# Patient Record
Sex: FEMALE | Race: WHITE | NOT HISPANIC OR LATINO | URBAN - METROPOLITAN AREA
[De-identification: names, ages, dates, MRNs, and addresses within clinical notes are randomized per-mention and may not be internally consistent; named-entity substitution may affect disease eponyms.]

---

## 2021-01-25 ENCOUNTER — OFFICE VISIT (OUTPATIENT)
Dept: INTERNAL MEDICINE | Facility: CLINIC | Age: 61
End: 2021-01-25
Payer: COMMERCIAL

## 2021-01-25 ENCOUNTER — TELEPHONE (OUTPATIENT)
Dept: INTERNAL MEDICINE | Facility: CLINIC | Age: 61
End: 2021-01-25

## 2021-01-25 VITALS
DIASTOLIC BLOOD PRESSURE: 82 MMHG | BODY MASS INDEX: 31.18 KG/M2 | WEIGHT: 194 LBS | HEIGHT: 66 IN | SYSTOLIC BLOOD PRESSURE: 132 MMHG | HEART RATE: 71 BPM | TEMPERATURE: 98.1 F | OXYGEN SATURATION: 97 %

## 2021-01-25 DIAGNOSIS — S46.011A TRAUMATIC TEAR OF RIGHT ROTATOR CUFF, UNSPECIFIED TEAR EXTENT, INITIAL ENCOUNTER: Primary | ICD-10-CM

## 2021-01-25 DIAGNOSIS — K63.5 POLYP OF COLON, UNSPECIFIED PART OF COLON, UNSPECIFIED TYPE: ICD-10-CM

## 2021-01-25 DIAGNOSIS — E78.5 HYPERLIPIDEMIA, UNSPECIFIED HYPERLIPIDEMIA TYPE: ICD-10-CM

## 2021-01-25 DIAGNOSIS — Z00.00 PHYSICAL EXAM: ICD-10-CM

## 2021-01-25 DIAGNOSIS — F51.04 PSYCHOPHYSIOLOGICAL INSOMNIA: ICD-10-CM

## 2021-01-25 PROBLEM — G89.29 CHRONIC RIGHT SHOULDER PAIN: Status: ACTIVE | Noted: 2021-01-25

## 2021-01-25 PROBLEM — M25.511 CHRONIC RIGHT SHOULDER PAIN: Status: ACTIVE | Noted: 2021-01-25

## 2021-01-25 PROCEDURE — 99396 PREV VISIT EST AGE 40-64: CPT | Performed by: INTERNAL MEDICINE

## 2021-01-25 RX ORDER — MIRTAZAPINE 7.5 MG/1
7.5 TABLET, FILM COATED ORAL NIGHTLY
Qty: 30 TABLET | Refills: 0 | Status: SHIPPED | OUTPATIENT
Start: 2021-01-25 | End: 2021-02-01 | Stop reason: ALTCHOICE

## 2021-01-25 SDOH — HEALTH STABILITY: MENTAL HEALTH: HOW OFTEN DO YOU HAVE A DRINK CONTAINING ALCOHOL?: MONTHLY OR LESS

## 2021-01-25 ASSESSMENT — ENCOUNTER SYMPTOMS
WHEEZING: 0
SHORTNESS OF BREATH: 0
DIZZINESS: 0
HEADACHES: 0
NECK PAIN: 0
UNEXPECTED WEIGHT CHANGE: 0
CHEST TIGHTNESS: 0
POLYPHAGIA: 0
MYALGIAS: 0
NERVOUS/ANXIOUS: 0
SLEEP DISTURBANCE: 0
BLOOD IN STOOL: 0
PALPITATIONS: 0
WEAKNESS: 0
ENDOCRINE NEGATIVE: 1
HEMATOLOGIC/LYMPHATIC NEGATIVE: 1
CONSTIPATION: 0
DIARRHEA: 0
APPETITE CHANGE: 0
POLYDIPSIA: 0
COLOR CHANGE: 0
FREQUENCY: 0
CARDIOVASCULAR NEGATIVE: 1
VOICE CHANGE: 0
ABDOMINAL PAIN: 0
DYSURIA: 0
ARTHRALGIAS: 1
FATIGUE: 0
BACK PAIN: 0
JOINT SWELLING: 0

## 2021-01-25 NOTE — ASSESSMENT & PLAN NOTE
She is working on diet control.  Reviewed heart healthy Mediterranean diet.  We will recheck in 6 months if no improvement will need statin

## 2021-01-25 NOTE — TELEPHONE ENCOUNTER
raya call Dr. Diana Christensen for the most recent blood work and immunization record- also nellie cm for colonoscopy - immunization from [facility]  - mammogram and gyne from Dr. Edmar Mcdowell ( [facility])

## 2021-01-25 NOTE — PROGRESS NOTES
Subjective      Patient ID: Mary Taylor is a 60 y.o. female.    She is here for a physical - new pt  - fell down a flight of stairs - has had persistant right shoulder pain -  Takes ibuprofen or tylenol without relief - had cortisone injection early November Has not had another.   Does not sleep well - falls asleep and wakes up but can't get back to sleep - has had lexapro and lunesta without relief.  Has a history of hyperlipidemia, uterine fibroids and hysterectomy. Still has cervix and ovaries. Has a history of colon polyps - last colonoscopy two years ago. - Mother with depression and psychosis   Father with lung caner sister with AODM.  Not on any medications at present.      The following have been reviewed and updated as appropriate in this visit:       Past Medical History:   Diagnosis Date   • Lipid disorder      Past Surgical History:   Procedure Laterality Date   • HYSTERECTOMY       Family History   Problem Relation Age of Onset   • Diabetes Biological Mother    • Lung cancer Biological Father    • Diabetes Biological Sister      Social History     Socioeconomic History   • Marital status: Other     Spouse name: Not on file   • Number of children: Not on file   • Years of education: Not on file   • Highest education level: Not on file   Occupational History   • Not on file   Social Needs   • Financial resource strain: Not on file   • Food insecurity     Worry: Not on file     Inability: Not on file   • Transportation needs     Medical: Not on file     Non-medical: Not on file   Tobacco Use   • Smoking status: Never Smoker   • Smokeless tobacco: Never Used   Substance and Sexual Activity   • Alcohol use: Yes     Frequency: Monthly or less   • Drug use: Never   • Sexual activity: Not on file   Lifestyle   • Physical activity     Days per week: Not on file     Minutes per session: Not on file   • Stress: Not on file   Relationships   • Social connections     Talks on phone: Not on file     Gets together:  Not on file     Attends Anglican service: Not on file     Active member of club or organization: Not on file     Attends meetings of clubs or organizations: Not on file     Relationship status: Not on file   • Intimate partner violence     Fear of current or ex partner: Not on file     Emotionally abused: Not on file     Physically abused: Not on file     Forced sexual activity: Not on file   Other Topics Concern   • Not on file   Social History Narrative   • Not on file       Review of Systems   Constitutional: Negative for appetite change, fatigue and unexpected weight change.   HENT: Negative for hearing loss and voice change.    Eyes: Negative for visual disturbance.   Respiratory: Negative for chest tightness, shortness of breath and wheezing.    Cardiovascular: Negative.  Negative for chest pain, palpitations and leg swelling.   Gastrointestinal: Negative for abdominal pain, blood in stool, constipation and diarrhea.   Endocrine: Negative.  Negative for cold intolerance, heat intolerance, polydipsia, polyphagia and polyuria.   Genitourinary: Negative for dysuria, frequency, pelvic pain, urgency and vaginal pain.   Musculoskeletal: Positive for arthralgias. Negative for back pain, gait problem, joint swelling, myalgias and neck pain.   Skin: Negative for color change, pallor and rash.   Neurological: Negative for dizziness, weakness and headaches.   Hematological: Negative.    Psychiatric/Behavioral: Negative for behavioral problems and sleep disturbance. The patient is not nervous/anxious.        No Known Allergies  Current Outpatient Medications   Medication Sig Dispense Refill   • mirtazapine (REMERON) 7.5 mg tablet Take 1 tablet (7.5 mg total) by mouth nightly. 30 tablet 0     No current facility-administered medications for this visit.        Objective   Vitals:    01/25/21 0802   BP: 132/82   BP Location: Right upper arm   Patient Position: Sitting   Pulse: 71   Temp: 36.7 °C (98.1 °F)   SpO2: 97%  "  Weight: 88 kg (194 lb)   Height: 1.676 m (5' 6\")       Physical Exam  Vitals signs and nursing note reviewed.   Constitutional:       Appearance: She is well-developed.   HENT:      Head: Normocephalic and atraumatic.      Right Ear: External ear normal.      Left Ear: External ear normal.      Nose: Nose normal.   Eyes:      Conjunctiva/sclera: Conjunctivae normal.      Pupils: Pupils are equal, round, and reactive to light.   Neck:      Musculoskeletal: Normal range of motion and neck supple.      Vascular: No carotid bruit.   Cardiovascular:      Rate and Rhythm: Normal rate and regular rhythm.      Heart sounds: Normal heart sounds. No murmur.   Pulmonary:      Effort: Pulmonary effort is normal.      Breath sounds: Normal breath sounds. No wheezing or rhonchi.   Abdominal:      General: Bowel sounds are normal.      Palpations: Abdomen is soft. There is no mass.      Tenderness: There is no abdominal tenderness.   Musculoskeletal: Normal range of motion.   Skin:     General: Skin is warm and dry.   Neurological:      Mental Status: She is alert and oriented to person, place, and time.   Psychiatric:         Behavior: Behavior normal.         Assessment/Plan   Problem List Items Addressed This Visit        Digestive    Colon polyps     He states her colonoscopy was 2 years ago I will need to obtain those results.  On a 5-year basis            Musculoskeletal    Traumatic tear of right rotator cuff - Primary     Concerned that she has a significant injury to her rotator cuff she is severely limited in her internal rotation and abduction.  Given that she has had symptoms for over 6 months now has failed on physical therapy and injection will obtain an MRI of the shoulder and have her see Dr. Braxton Beckett of orthopedics         Relevant Orders    MRI SHOULDER RIGHT WITHOUT CONTRAST    CBC       Endocrine/Metabolic    Hyperlipidemia, unspecified     She is working on diet control.  Reviewed heart healthy " Mediterranean diet.  We will recheck in 6 months if no improvement will need statin         Relevant Orders    Comprehensive metabolic panel    Lipid panel    TSH 3rd Generation       Other    Physical exam     Overall well-appearing.  I have reviewed the blood studies that she brought with her.  We will obtain a full blood panel as well.  She is current with her flu vaccine.  Obtain her immunization record from her prior PCP.  Should obtain Shingrix and Tdap but she wishes to wait until after the pandemic.  GYN exam is current as is mammogram.  We will obtain those results as well         Psychophysiological insomnia     Discussion regarding this.  I would like her to try mirtazapine 7.5 mg at bedtime.               Yaquelin Spence MD    1/25/2021

## 2021-01-25 NOTE — TELEPHONE ENCOUNTER
Requested most recent blood work and immunization record from Dr. Prabhakar office.  Requested most recent colonoscopy report from [facility].  Requested most recent immunization records fro [facility].  Requested most recent mammogram and gyn note from Dr. Lira office.

## 2021-01-25 NOTE — ASSESSMENT & PLAN NOTE
Overall well-appearing.  I have reviewed the blood studies that she brought with her.  We will obtain a full blood panel as well.  She is current with her flu vaccine.  Obtain her immunization record from her prior PCP.  Should obtain Shingrix and Tdap but she wishes to wait until after the pandemic.  GYN exam is current as is mammogram.  We will obtain those results as well

## 2021-01-25 NOTE — ASSESSMENT & PLAN NOTE
Concerned that she has a significant injury to her rotator cuff she is severely limited in her internal rotation and abduction.  Given that she has had symptoms for over 6 months now has failed on physical therapy and injection will obtain an MRI of the shoulder and have her see Dr. Braxton Beckett of orthopedics

## 2021-02-01 RX ORDER — RAMELTEON 8 MG/1
8 TABLET ORAL NIGHTLY
Qty: 7 TABLET | Refills: 0 | Status: SHIPPED | OUTPATIENT
Start: 2021-02-01 | End: 2021-02-18 | Stop reason: ALTCHOICE

## 2021-02-17 RX ORDER — MIRTAZAPINE 7.5 MG/1
TABLET, FILM COATED ORAL
Qty: 30 TABLET | Refills: 0 | OUTPATIENT
Start: 2021-02-17

## 2021-02-18 RX ORDER — SUVOREXANT 10 MG/1
10 TABLET, FILM COATED ORAL NIGHTLY PRN
Qty: 7 TABLET | Refills: 0 | Status: SHIPPED | OUTPATIENT
Start: 2021-02-18 | End: 2021-02-19 | Stop reason: SDUPTHER

## 2021-02-19 ENCOUNTER — TELEPHONE (OUTPATIENT)
Dept: INTERNAL MEDICINE | Facility: CLINIC | Age: 61
End: 2021-02-19

## 2021-02-19 DIAGNOSIS — S46.011A TRAUMATIC TEAR OF RIGHT ROTATOR CUFF, UNSPECIFIED TEAR EXTENT, INITIAL ENCOUNTER: ICD-10-CM

## 2021-02-19 RX ORDER — SUVOREXANT 10 MG/1
10 TABLET, FILM COATED ORAL NIGHTLY PRN
Qty: 30 TABLET | Refills: 1 | Status: SHIPPED | OUTPATIENT
Start: 2021-02-19 | End: 2021-02-19 | Stop reason: ALTCHOICE

## 2021-02-19 RX ORDER — ZOLPIDEM TARTRATE 5 MG/1
5 TABLET ORAL NIGHTLY PRN
Qty: 30 TABLET | Refills: 1 | Status: SHIPPED | OUTPATIENT
Start: 2021-02-19 | End: 2021-03-02 | Stop reason: ALTCHOICE

## 2021-02-19 NOTE — TELEPHONE ENCOUNTER
Re Belsomra 10 mg tablet:    Alternative requested, non formulary.    Also must be written for 30 tablets.  Unbreakable packaging.

## 2021-02-19 NOTE — TELEPHONE ENCOUNTER
Can you please call open MRI at [facility] in [facility] for the pts MRI of the shoulder

## 2021-03-02 RX ORDER — ZOLPIDEM TARTRATE 6.25 MG/1
6.25 TABLET, FILM COATED, EXTENDED RELEASE ORAL NIGHTLY PRN
Qty: 30 TABLET | Refills: 3 | Status: SHIPPED | OUTPATIENT
Start: 2021-03-02 | End: 2021-03-23 | Stop reason: SDUPTHER

## 2021-03-23 RX ORDER — ZOLPIDEM TARTRATE 6.25 MG/1
6.25 TABLET, FILM COATED, EXTENDED RELEASE ORAL NIGHTLY PRN
Qty: 30 TABLET | Refills: 3 | Status: SHIPPED | OUTPATIENT
Start: 2021-03-23 | End: 2021-11-08 | Stop reason: ALTCHOICE

## 2021-03-31 ENCOUNTER — APPOINTMENT (RX ONLY)
Dept: URBAN - METROPOLITAN AREA CLINIC 23 | Facility: CLINIC | Age: 61
Setting detail: DERMATOLOGY
End: 2021-03-31

## 2021-03-31 DIAGNOSIS — D22 MELANOCYTIC NEVI: ICD-10-CM

## 2021-03-31 DIAGNOSIS — L28.1 PRURIGO NODULARIS: ICD-10-CM

## 2021-03-31 DIAGNOSIS — L81.4 OTHER MELANIN HYPERPIGMENTATION: ICD-10-CM

## 2021-03-31 DIAGNOSIS — L57.0 ACTINIC KERATOSIS: ICD-10-CM

## 2021-03-31 DIAGNOSIS — L82.1 OTHER SEBORRHEIC KERATOSIS: ICD-10-CM

## 2021-03-31 PROBLEM — D48.5 NEOPLASM OF UNCERTAIN BEHAVIOR OF SKIN: Status: ACTIVE | Noted: 2021-03-31

## 2021-03-31 PROBLEM — D22.5 MELANOCYTIC NEVI OF TRUNK: Status: ACTIVE | Noted: 2021-03-31

## 2021-03-31 PROCEDURE — ? INTRALESIONAL KENALOG

## 2021-03-31 PROCEDURE — ? LIQUID NITROGEN

## 2021-03-31 PROCEDURE — 11900 INJECT SKIN LESIONS </W 7: CPT | Mod: 59

## 2021-03-31 PROCEDURE — ? PRESCRIPTION

## 2021-03-31 PROCEDURE — 99203 OFFICE O/P NEW LOW 30 MIN: CPT | Mod: 25

## 2021-03-31 PROCEDURE — 17000 DESTRUCT PREMALG LESION: CPT | Mod: 59

## 2021-03-31 PROCEDURE — ? COUNSELING

## 2021-03-31 PROCEDURE — 11102 TANGNTL BX SKIN SINGLE LES: CPT

## 2021-03-31 PROCEDURE — ? ADDITIONAL NOTES

## 2021-03-31 PROCEDURE — ? SUNSCREEN TREATMENT REGIMEN

## 2021-03-31 PROCEDURE — ? BIOPSY BY SHAVE METHOD

## 2021-03-31 RX ORDER — TRIAMCINOLONE ACETONIDE 1 MG/G
OINTMENT TOPICAL
Qty: 1 | Refills: 0 | Status: ERX | COMMUNITY
Start: 2021-03-31

## 2021-03-31 RX ADMIN — TRIAMCINOLONE ACETONIDE: 1 OINTMENT TOPICAL at 00:00

## 2021-03-31 ASSESSMENT — LOCATION ZONE DERM
LOCATION ZONE: TRUNK
LOCATION ZONE: ARM

## 2021-03-31 ASSESSMENT — LOCATION SIMPLE DESCRIPTION DERM
LOCATION SIMPLE: ABDOMEN
LOCATION SIMPLE: RIGHT SHOULDER
LOCATION SIMPLE: LEFT UPPER BACK
LOCATION SIMPLE: CHEST

## 2021-03-31 ASSESSMENT — LOCATION DETAILED DESCRIPTION DERM
LOCATION DETAILED: LEFT MEDIAL SUPERIOR CHEST
LOCATION DETAILED: RIGHT ANTERIOR SHOULDER
LOCATION DETAILED: LEFT SUPERIOR UPPER BACK
LOCATION DETAILED: EPIGASTRIC SKIN
LOCATION DETAILED: RIGHT POSTERIOR SHOULDER

## 2021-03-31 NOTE — PROCEDURE: INTRALESIONAL KENALOG
Total Volume Injected (Ccs- Only Use Numbers And Decimals): 0.4
X Size Of Lesion In Cm (Optional): 0
Consent: The risks of atrophy were reviewed with the patient.
Detail Level: Simple
Administered By (Optional): Carrol Fernandez
Medical Necessity Clause: This procedure was medically necessary because the lesions that were treated were:
Include Z78.9 (Other Specified Conditions Influencing Health Status) As An Associated Diagnosis?: No
Concentration Of Solution Injected (Mg/Ml): 7.5
Kenalog Preparation: Kenalog

## 2021-03-31 NOTE — PROCEDURE: LIQUID NITROGEN
Post-Care Instructions: I reviewed with the patient in detail post-care instructions. Patient is to wear sunprotection, and avoid picking at any of the treated lesions. Pt may apply Vaseline to crusted or scabbing areas.
Consent: The patient's consent was obtained including but not limited to risks of crusting, scabbing, blistering, scarring, darker or lighter pigmentary change, recurrence, incomplete removal and infection.
Render Note In Bullet Format When Appropriate: No
Number Of Freeze-Thaw Cycles: 2 freeze-thaw cycles
Duration Of Freeze Thaw-Cycle (Seconds): 3
Detail Level: Simple

## 2021-04-19 RX ORDER — MIRTAZAPINE 7.5 MG/1
TABLET, FILM COATED ORAL
Qty: 30 TABLET | Refills: 0 | Status: SHIPPED | OUTPATIENT
Start: 2021-04-19 | End: 2021-09-11 | Stop reason: ALTCHOICE

## 2021-09-08 DIAGNOSIS — R73.01 ELEVATED FASTING BLOOD SUGAR: ICD-10-CM

## 2021-09-08 DIAGNOSIS — Z11.59 ENCOUNTER FOR HEPATITIS C SCREENING TEST FOR LOW RISK PATIENT: Primary | ICD-10-CM

## 2021-09-08 DIAGNOSIS — R74.01 ELEVATED TRANSAMINASE LEVEL: ICD-10-CM

## 2021-09-08 LAB
ALBUMIN SERPL-MCNC: 4.5 G/DL (ref 3.8–4.9)
ALBUMIN/GLOB SERPL: 1.7 {RATIO} (ref 1.2–2.2)
ALP SERPL-CCNC: 75 IU/L (ref 48–121)
ALT SERPL-CCNC: 39 IU/L (ref 0–32)
AST SERPL-CCNC: 27 IU/L (ref 0–40)
BILIRUB SERPL-MCNC: 0.7 MG/DL (ref 0–1.2)
BUN SERPL-MCNC: 18 MG/DL (ref 6–24)
BUN/CREAT SERPL: 26 (ref 9–23)
CALCIUM SERPL-MCNC: 9.6 MG/DL (ref 8.7–10.2)
CHLORIDE SERPL-SCNC: 103 MMOL/L (ref 96–106)
CHOLEST SERPL-MCNC: 235 MG/DL (ref 100–199)
CO2 SERPL-SCNC: 26 MMOL/L (ref 20–29)
CREAT SERPL-MCNC: 0.7 MG/DL (ref 0.57–1)
ERYTHROCYTE [DISTWIDTH] IN BLOOD BY AUTOMATED COUNT: 13.5 % (ref 11.7–15.4)
GLOBULIN SER CALC-MCNC: 2.6 G/DL (ref 1.5–4.5)
GLUCOSE SERPL-MCNC: 101 MG/DL (ref 65–99)
HCT VFR BLD AUTO: 37.4 % (ref 34–46.6)
HDLC SERPL-MCNC: 40 MG/DL
HGB BLD-MCNC: 12.6 G/DL (ref 11.1–15.9)
LAB CORP EGFR IF AFRICN AM: 110 ML/MIN/1.73
LAB CORP EGFR IF NONAFRICN AM: 95 ML/MIN/1.73
LDLC SERPL CALC-MCNC: 165 MG/DL (ref 0–99)
MCH RBC QN AUTO: 27.3 PG (ref 26.6–33)
MCHC RBC AUTO-ENTMCNC: 33.7 G/DL (ref 31.5–35.7)
MCV RBC AUTO: 81 FL (ref 79–97)
PLATELET # BLD AUTO: 252 X10E3/UL (ref 150–450)
POTASSIUM SERPL-SCNC: 4.5 MMOL/L (ref 3.5–5.2)
PROT SERPL-MCNC: 7.1 G/DL (ref 6–8.5)
RBC # BLD AUTO: 4.62 X10E6/UL (ref 3.77–5.28)
SODIUM SERPL-SCNC: 141 MMOL/L (ref 134–144)
TRIGL SERPL-MCNC: 161 MG/DL (ref 0–149)
TSH SERPL DL<=0.005 MIU/L-ACNC: 2.33 UIU/ML (ref 0.45–4.5)
VLDLC SERPL CALC-MCNC: 30 MG/DL (ref 5–40)
WBC # BLD AUTO: 6.8 X10E3/UL (ref 3.4–10.8)

## 2021-10-15 ENCOUNTER — TELEPHONE (OUTPATIENT)
Dept: INTERNAL MEDICINE | Facility: CLINIC | Age: 61
End: 2021-10-15

## 2021-10-15 LAB
ALBUMIN SERPL-MCNC: 4.7 G/DL (ref 3.8–4.9)
ALP SERPL-CCNC: 78 IU/L (ref 44–121)
ALT SERPL-CCNC: 48 IU/L (ref 0–32)
AST SERPL-CCNC: 31 IU/L (ref 0–40)
BILIRUB DIRECT SERPL-MCNC: 0.13 MG/DL (ref 0–0.4)
BILIRUB SERPL-MCNC: 0.6 MG/DL (ref 0–1.2)
HBA1C MFR BLD: 6.1 % (ref 4.8–5.6)
HCV AB S/CO SERPL IA: <0.1 S/CO RATIO (ref 0–0.9)
PROT SERPL-MCNC: 7.1 G/DL (ref 6–8.5)

## 2021-10-15 NOTE — TELEPHONE ENCOUNTER
Please have pt make telemed or office visit to go over her results of her liver function tests - see e mail

## 2021-11-08 ENCOUNTER — TELEMEDICINE (OUTPATIENT)
Dept: INTERNAL MEDICINE | Facility: CLINIC | Age: 61
End: 2021-11-08
Payer: COMMERCIAL

## 2021-11-08 DIAGNOSIS — K76.0 HEPATIC STEATOSIS: ICD-10-CM

## 2021-11-08 DIAGNOSIS — R79.89 ELEVATED LIVER FUNCTION TESTS: Primary | ICD-10-CM

## 2021-11-08 PROCEDURE — 99213 OFFICE O/P EST LOW 20 MIN: CPT | Mod: 95 | Performed by: INTERNAL MEDICINE

## 2021-11-08 ASSESSMENT — ENCOUNTER SYMPTOMS
NAUSEA: 0
DIARRHEA: 0
CONSTIPATION: 0
UNEXPECTED WEIGHT CHANGE: 0
ABDOMINAL PAIN: 0

## 2021-11-08 NOTE — PROGRESS NOTES
Verification of Patient Location:  The patient affirms they are currently located in the following state: [place]    Request for Consent:    Audio and Video Encounter   Hello, my name is Yaquelin Spence MD.  Before we proceed, can you please verify your identification by telling me your full name and date of birth?  Can you tell me who is in the room with you?    You and I are about to have a telemedicine check-in or visit because you have requested it.  This is a live video-conference.  I am a real person, speaking to you in real time.  There is no one else with me on the video-conference.  However, when we use ([employer], uConnect, etc) it is important for you to know that the video-conference may not be secure or private.  I am not recording this conversation and I am asking you not to record it.  This telemedicine visit will be billed to your health insurance or you, if you are self-insured.  You understand you will be responsible for any copayments or coinsurances that apply to your telemedicine visit.  Communication platform used for this encounter:  [facility] Video Visit (with Zoom integration)     Before starting our telemedicine visit, I am required to get your consent for this virtual check-in or visit by telemedicine. Do you consent?      Patient Response to Request for Consent:  Yes      Visit Documentation:  Subjective     Patient ID: Mary Taylor is a 61 y.o. female.  1960      HPI   This is a telemedicine video visit via [employer] to go over recent laboratory tests.  Of note she has a mild elevation in her transaminases.  Denies any use of nonsteroidal anti-inflammatory medication she does use Tylenol periodically but not in excess of 2000 mg daily.  She does not drink alcohol to any significant degree.  No known history of blood transfusion however she has had a hysterectomy was not sure if she received blood then.  She feels well.  Denies any abdominal pain nausea vomiting change in  bowel habits jaundice or change in color of her stool.  She does occasionally get indigestion with reflux of gastric material into the esophagus and throat when she eats later at night and larger amounts.  No melena or hematochezia.  Review of prior records reveals history of hepatic steatosis by ultrasound in 2019.    The following have been reviewed and updated as appropriate in this visit:       Review of Systems   Constitutional: Negative for unexpected weight change.   Gastrointestinal: Negative for abdominal pain, constipation, diarrhea and nausea.         Assessment/Plan   Diagnoses and all orders for this visit:    Elevated liver function tests (Primary)  Assessment & Plan:  This is most likely secondary to hepatic steatosis.  However we will screen for hepatitis B and C check CAIT and ferritin as well.    Orders:  -     Hepatitis C antibody; Future  -     Hepatitis B surface antigen; Future  -     Hepatitis B surface antibody; Future  -     Hepatitis B core antibody, total; Future  -     Ferritin; Future  -     CAIT Screen (Automated); Future  -     ULTRASOUND ABDOMEN LIMITED; Future    Hepatic steatosis  Assessment & Plan:  We will recheck her ultrasound of the right upper quadrant.  Counseled on low carbohydrate low sugar diet.    Orders:  -     ULTRASOUND ABDOMEN LIMITED; Future      Time Spent:  I spent 20 minutes on this date of service performing the following activities: obtaining history, entering orders, documenting, providing counseling and education and communicating results.

## 2021-11-08 NOTE — LETTER
[facility] HEALTH LAB REQUISITION  [facility] Internal Medicine [address] [place]  [facility] [address]  Phone:  [phone]  Fax:  [phone]    [facility] Account Number - [id number]  [facility] Account Number - [id number]      Patient:    Duane Wilder MRN:  [id number]   [address] [facility] [address] :  1960  Sex:  F   Phone: [phone]      INSURANCE PAYOR PLAN GROUP # SUBSCRIBER ID   Primary:   [facility] [id number]  Insurance: [facility]  Plan Name: PERSONAL CHOICE [id number]     Order Date:  2021             ULTRASOUND ABDOMEN LIMITED  CPT: 63965   (Order ID: [id number])   Diagnosis:  Elevated liver function tests (R79.89)  Hepatic steatosis (K76.0)   Priority:  Routine  Release to patient: Immediate                       Authorized by: Yaquelin Reina MD   (NPI: [id number])    E-Signature: Yaquelin Reina MD                       To schedule radiology appointments with [facility]     call Central Scheduling at [phone]  To schedule PET scans call PET Scheduling [phone]    To schedule Low-Dose CT Scan for Lung Cancer Screening  call [phone]        Lab draws do not require an appointment                 [facility] Page 3 of 1   Account #: [id number] Collection Date:         Req/Control #: [id number] Collection Time:            Client / Ordering Site Information: Physician Information:   Account Name: [facility] [place]   Address 1: [address] [place] [address] [place]   Address 2:    [facility] Zip: [place] [address]   Phone: [phone]    Ordering: Yaquelin Reina   Degree: MD   NPI: [id number]   UPIN:    Physician ID:          Patient Information:   Name: DUANE WILDER   Gender: Female   SSN: [id number]   Patient ID: [id number]    YOB: 1960 (61 years)   Phone: [phone]   Address: [address] [place] [address]  [place] [address]            Comments:         Order Code Tests Ordered (Total: 6)    Order Code Tests Ordered      926697 Hepatitis C antibody   219474 Hepatitis B surface antigen   410959 Hepatitis B surface antibody    168947 Hepatitis B core antibody, total   892789 Ferritin   466354 CAIT Screen (Automated)            Specimen Source:   (897951) Hepatitis C antibody:  Blood, Venous    (280426) Hepatitis B surface antigen:  Blood, Venous      (080935) Hepatitis B surface antibody:  Blood, Venous    (619413) Hepatitis B core antibody, total:  Blood, Venous      (806708) Ferritin:  Blood, Venous    (460498) CAIT Screen (Automated):  Blood, Venous            Diagnosis Codes:   R79.89                 Bill Type: Third-Party    Carrier Code: Not on file         Responsible Party / Guarantor Information:   Name: DUANE WILDER   Address: [address] [place] [address] Zip: [place] [address]   Phone: [phone]   Relation to Pt: Self   Employer Name:          ABN:     Worker's Comp: N Date of Injury:             Insurance Information:   Primary Insurance: Secondary Insurance:   Carrier Code: Not on file   Ins Co Name: PERSONAL CHOICE   Address 1: [facility] [address]   Address 2:    [facility] Zip: [place] [address]   Policy Number: [id number]   Group #: [id number]    Carrier Code:    Ins Co Name:    Address 1:    Address 2:    City, State Zip:    Policy Number:    Group #:       Primary Policy De Dios / Insured: Secondary Policy De [place] / Insured:   Name: DUANE WILDER   Address: [address] [place] [address]   Pt Relation to Subscriber: Self    Name:    Address:         Pt Relation to Subscriber:           Authorization - Please Sign and Date  I hereby authorize the release of medical information related to the services described hereon and authorize payment directly to [facility].      E-Signature: Yaquelin Reina MD                          11/8/2021       __  Provider Signature             Date      __________________________________                    ______________  Patient Signature                                                                    Date

## 2021-11-08 NOTE — ASSESSMENT & PLAN NOTE
We will recheck her ultrasound of the right upper quadrant.  Counseled on low carbohydrate low sugar diet.

## 2021-11-08 NOTE — ASSESSMENT & PLAN NOTE
This is most likely secondary to hepatic steatosis.  However we will screen for hepatitis B and C check CAIT and ferritin as well.

## 2022-01-15 ENCOUNTER — TELEPHONE (OUTPATIENT)
Dept: INTERNAL MEDICINE | Facility: CLINIC | Age: 62
End: 2022-01-15
Payer: COMMERCIAL

## 2022-01-15 ENCOUNTER — HOSPITAL ENCOUNTER (OUTPATIENT)
Dept: RADIOLOGY | Age: 62
Discharge: HOME | End: 2022-01-15
Attending: INTERNAL MEDICINE
Payer: COMMERCIAL

## 2022-01-15 DIAGNOSIS — R79.89 ELEVATED LIVER FUNCTION TESTS: ICD-10-CM

## 2022-01-15 DIAGNOSIS — K76.0 HEPATIC STEATOSIS: ICD-10-CM

## 2022-01-15 LAB
ANA TITR SER IF: NEGATIVE {TITER}
FERRITIN SERPL-MCNC: 93 NG/ML (ref 15–150)
HBV CORE AB SERPL QL IA: NEGATIVE
HBV CORE IGM SERPL QL IA: NEGATIVE
HBV SURFACE AB SER QL: NON REACTIVE
HBV SURFACE AG SERPL QL IA: NEGATIVE
HCV AB S/CO SERPL IA: 0.1 S/CO RATIO (ref 0–0.9)
LABORATORY COMMENT REPORT: NORMAL

## 2022-01-15 PROCEDURE — 76705 ECHO EXAM OF ABDOMEN: CPT

## 2022-01-18 NOTE — TELEPHONE ENCOUNTER
Left detailed message on her phone and instruction give. Requesting pt to call back for the questions.

## 2022-01-31 ENCOUNTER — TELEPHONE (OUTPATIENT)
Dept: INTERNAL MEDICINE | Facility: CLINIC | Age: 62
End: 2022-01-31

## 2022-01-31 ENCOUNTER — OFFICE VISIT (OUTPATIENT)
Dept: INTERNAL MEDICINE | Facility: CLINIC | Age: 62
End: 2022-01-31
Payer: COMMERCIAL

## 2022-01-31 VITALS
WEIGHT: 198 LBS | SYSTOLIC BLOOD PRESSURE: 118 MMHG | BODY MASS INDEX: 31.82 KG/M2 | OXYGEN SATURATION: 98 % | TEMPERATURE: 99.2 F | DIASTOLIC BLOOD PRESSURE: 80 MMHG | HEIGHT: 66 IN | HEART RATE: 72 BPM

## 2022-01-31 DIAGNOSIS — K63.5 POLYP OF COLON, UNSPECIFIED PART OF COLON, UNSPECIFIED TYPE: ICD-10-CM

## 2022-01-31 DIAGNOSIS — Z00.00 PHYSICAL EXAM: Primary | ICD-10-CM

## 2022-01-31 DIAGNOSIS — E78.5 HYPERLIPIDEMIA, UNSPECIFIED HYPERLIPIDEMIA TYPE: ICD-10-CM

## 2022-01-31 DIAGNOSIS — M54.50 CHRONIC RIGHT-SIDED LOW BACK PAIN WITHOUT SCIATICA: ICD-10-CM

## 2022-01-31 DIAGNOSIS — R73.01 ELEVATED FASTING GLUCOSE: ICD-10-CM

## 2022-01-31 DIAGNOSIS — Z12.31 SCREENING MAMMOGRAM FOR BREAST CANCER: ICD-10-CM

## 2022-01-31 DIAGNOSIS — G89.29 CHRONIC RIGHT-SIDED LOW BACK PAIN WITHOUT SCIATICA: ICD-10-CM

## 2022-01-31 DIAGNOSIS — K76.0 HEPATIC STEATOSIS: ICD-10-CM

## 2022-01-31 PROBLEM — F32.A MILD DEPRESSION: Status: ACTIVE | Noted: 2022-01-31

## 2022-01-31 PROCEDURE — 3008F BODY MASS INDEX DOCD: CPT | Performed by: INTERNAL MEDICINE

## 2022-01-31 PROCEDURE — 99396 PREV VISIT EST AGE 40-64: CPT | Performed by: INTERNAL MEDICINE

## 2022-01-31 RX ORDER — ESCITALOPRAM OXALATE 10 MG/1
10 TABLET ORAL DAILY
Qty: 90 TABLET | Refills: 3 | Status: SHIPPED | OUTPATIENT
Start: 2022-01-31 | End: 2022-03-21 | Stop reason: SDUPTHER

## 2022-01-31 ASSESSMENT — ENCOUNTER SYMPTOMS
CHEST TIGHTNESS: 0
FREQUENCY: 0
NUMBNESS: 0
ABDOMINAL PAIN: 0
JOINT SWELLING: 0
MYALGIAS: 0
WEAKNESS: 0
HEADACHES: 0
ARTHRALGIAS: 1
DIARRHEA: 0
BACK PAIN: 1
CARDIOVASCULAR NEGATIVE: 1
CONSTIPATION: 0
UNEXPECTED WEIGHT CHANGE: 0
HEMATOLOGIC/LYMPHATIC NEGATIVE: 1
COLOR CHANGE: 0
FATIGUE: 0
POLYPHAGIA: 0
DIZZINESS: 0
PALPITATIONS: 0
BLOOD IN STOOL: 0
APPETITE CHANGE: 0
NERVOUS/ANXIOUS: 0
VOICE CHANGE: 0
NECK PAIN: 0
SHORTNESS OF BREATH: 0
POLYDIPSIA: 0
WHEEZING: 0
SLEEP DISTURBANCE: 0
DYSURIA: 0
ENDOCRINE NEGATIVE: 1

## 2022-01-31 ASSESSMENT — PATIENT HEALTH QUESTIONNAIRE - PHQ9
SUM OF ALL RESPONSES TO PHQ QUESTIONS 1-9: 6
SUM OF ALL RESPONSES TO PHQ9 QUESTIONS 1 & 2: 3

## 2022-01-31 NOTE — PROGRESS NOTES
Subjective      Patient ID: Mary Taylor is a 61 y.o. female.    HPI  She is here for physical exam.  Overall has been feeling well but she does have several concerns today.  One is that she has been feeling somewhat more depressed during the pandemic.  Previously had been on Escitalopram and would like to consider starting this again.  She has no thoughts of harming herself.  She also relates that she has had some chronic right lower back discomfort centered over her sacroiliac joint with some radiation towards the right hip.  This has been going on for 2 to 3 years but she thinks recently it is getting worse.  Does not have any radiation of the pain down her legs no weakness numbness bowel or bladder discomfort.  She also is concerned about her weight which is not at goal.  BMI 31.96.  Is interested in pursuing a weight loss program.  She is due for her routine blood studies.Mammogram is due as well. Gyn current with pap - sees Dr. Cielo Mcdowell - will need to obtain that report. Colonoscopy is up-to-date.  She is current with her flu vaccine as well as her COVID vaccination series including her booster and is current with Shingrix    The following have been reviewed and updated as appropriate in this visit:   Allergies  Meds  Problems       Past Medical History:   Diagnosis Date   • Lipid disorder      Past Surgical History:   Procedure Laterality Date   • HYSTERECTOMY       Family History   Problem Relation Age of Onset   • Diabetes Biological Mother    • Lung cancer Biological Father    • Diabetes Biological Sister      Social History     Socioeconomic History   • Marital status: Legally      Spouse name: Not on file   • Number of children: Not on file   • Years of education: Not on file   • Highest education level: Not on file   Occupational History   • Not on file   Tobacco Use   • Smoking status: Never Smoker   • Smokeless tobacco: Never Used   Vaping Use   • Vaping Use: Never used   Substance and  "Sexual Activity   • Alcohol use: Yes   • Drug use: Never   • Sexual activity: Not on file   Other Topics Concern   • Not on file   Social History Narrative   • Not on file     Social Determinants of Health     Financial Resource Strain: Not on file   Food Insecurity: Not on file   Transportation Needs: Not on file   Physical Activity: Not on file   Stress: Not on file   Social Connections: Not on file   Intimate Partner Violence: Not on file   Housing Stability: Not on file       Review of Systems   Constitutional: Negative for appetite change, fatigue and unexpected weight change.   HENT: Negative for hearing loss and voice change.    Eyes: Negative for visual disturbance.   Respiratory: Negative for chest tightness, shortness of breath and wheezing.    Cardiovascular: Negative.  Negative for chest pain, palpitations and leg swelling.   Gastrointestinal: Negative for abdominal pain, blood in stool, constipation and diarrhea.   Endocrine: Negative.  Negative for cold intolerance, heat intolerance, polydipsia, polyphagia and polyuria.   Genitourinary: Negative for dysuria, frequency, pelvic pain, urgency and vaginal pain.   Musculoskeletal: Positive for arthralgias and back pain. Negative for gait problem, joint swelling, myalgias and neck pain.   Skin: Negative for color change, pallor and rash.   Neurological: Negative for dizziness, weakness, numbness and headaches.   Hematological: Negative.    Psychiatric/Behavioral: Negative for behavioral problems and sleep disturbance. The patient is not nervous/anxious.        No Known Allergies  Current Outpatient Medications   Medication Sig Dispense Refill   • escitalopram (LEXAPRO) 10 mg tablet Take 1 tablet (10 mg total) by mouth daily. 90 tablet 3     No current facility-administered medications for this visit.       Objective   Vitals:    01/31/22 0803   BP: 118/80   Pulse: 72   Temp: 37.3 °C (99.2 °F)   SpO2: 98%   Weight: 89.8 kg (198 lb)   Height: 1.676 m (5' 6\") "       Physical Exam  Vitals and nursing note reviewed.   Constitutional:       Appearance: She is well-developed.   HENT:      Head: Normocephalic and atraumatic.      Right Ear: External ear normal.      Left Ear: External ear normal.      Nose: Nose normal.   Eyes:      Conjunctiva/sclera: Conjunctivae normal.      Pupils: Pupils are equal, round, and reactive to light.   Cardiovascular:      Rate and Rhythm: Normal rate and regular rhythm.      Heart sounds: Normal heart sounds.   Pulmonary:      Effort: Pulmonary effort is normal.      Breath sounds: Normal breath sounds.   Abdominal:      General: Bowel sounds are normal.      Palpations: Abdomen is soft. There is no mass.   Musculoskeletal:         General: Normal range of motion.      Cervical back: Normal range of motion and neck supple.      Right lower leg: No edema.      Left lower leg: No edema.   Skin:     General: Skin is warm and dry.   Neurological:      Mental Status: She is alert and oriented to person, place, and time.   Psychiatric:         Behavior: Behavior normal.         Assessment/Plan   Problem List Items Addressed This Visit        Nervous    Chronic right-sided low back pain without sciatica     Possibly secondary to lumbar spondylosis.  We will check corresponding x-rays of the lumbar spine as well as the SI joint and hip.  Consider physical therapy and/or course of nonsteroidal antiinflammatories pending x-ray results.         Relevant Orders    X-RAY LUMBAR SPINE COMPLETE 4+ VIEWS    X-RAY HIP WITH OR WITHOUT PELVIS 4+ VW RIGHT    X-RAY SACROILIAC JOINTS 3+ VIEWS       Digestive    Colon polyps     Up-to-date with colonoscopy         Relevant Orders    Comprehensive metabolic panel    TSH 3rd Generation    Lipid panel    Hemoglobin A1c    Hepatic steatosis     Counseled on low carbohydrate low sugar diet.  Increase physical activity         Relevant Orders    Comprehensive metabolic panel    TSH 3rd Generation    Lipid panel     Hemoglobin A1c       Endocrine/Metabolic    Hyperlipidemia, unspecified     She is due for follow-up fasting lipid profile working on diet control         Relevant Orders    Comprehensive metabolic panel    TSH 3rd Generation    Lipid panel    Hemoglobin A1c    Elevated fasting glucose     Will screen with hemoglobin A1c and repeat fasting blood sugar         Relevant Orders    Comprehensive metabolic panel       Other    Physical exam - Primary     Well appearing. All appropriate screenings reviewed as well as vaccines. Advised yearly eye exam , regular dental exam, yearly skin check.    Advised mammogram.  Yearly blood work . Yearly dermatology exam and eye exam.  Will work aggressively on weight control - referred to [facility] weight loss program                  Relevant Orders    Comprehensive metabolic panel    TSH 3rd Generation    Lipid panel    Hemoglobin A1c      Other Visit Diagnoses     Screening mammogram for breast cancer        Relevant Orders    BI SCREENING MAMMOGRAM BILATERAL(TOMOSYNTHESIS)          Yaquelin Spence MD    1/31/2022

## 2022-02-01 NOTE — ASSESSMENT & PLAN NOTE
Possibly secondary to lumbar spondylosis.  We will check corresponding x-rays of the lumbar spine as well as the SI joint and hip.  Consider physical therapy and/or course of nonsteroidal antiinflammatories pending x-ray results.

## 2022-02-01 NOTE — ASSESSMENT & PLAN NOTE
Well appearing. All appropriate screenings reviewed as well as vaccines. Advised yearly eye exam , regular dental exam, yearly skin check.    Advised mammogram.  Yearly blood work . Yearly dermatology exam and eye exam.  Will work aggressively on weight control - referred to [facility] weight loss program

## 2022-03-21 RX ORDER — ESCITALOPRAM OXALATE 10 MG/1
10 TABLET ORAL DAILY
Qty: 90 TABLET | Refills: 0 | Status: SHIPPED | OUTPATIENT
Start: 2022-03-21 | End: 2022-05-18

## 2022-03-21 NOTE — TELEPHONE ENCOUNTER
Pt requesting Rx sent to [facility] in [place].          Medicine Refill Request    Last Office: 1/31/2022   Last Consult Visit: Visit date not found  Last Telemedicine Visit: 11/8/2021 Yaquelin Reina MD    Next Appointment: Visit date not found      Current Outpatient Medications:   •  escitalopram (LEXAPRO) 10 mg tablet, Take 1 tablet (10 mg total) by mouth daily., Disp: 90 tablet, Rfl: 3      BP Readings from Last 3 Encounters:   01/31/22 118/80   01/25/21 132/82       Recent Lab results:  Lab Results   Component Value Date    CHOL 235 (H) 09/07/2021   ,   Lab Results   Component Value Date    HDL 40 09/07/2021   ,   Lab Results   Component Value Date    LDLCALC 165 (H) 09/07/2021   ,   Lab Results   Component Value Date    TRIG 161 (H) 09/07/2021        Lab Results   Component Value Date    GLUCOSE 101 (H) 09/07/2021   ,   Lab Results   Component Value Date    HGBA1C 6.1 (H) 10/14/2021         Lab Results   Component Value Date    CREATININE 0.70 09/07/2021       Lab Results   Component Value Date    TSH 2.330 09/07/2021

## 2022-03-30 DIAGNOSIS — Z12.31 SCREENING MAMMOGRAM FOR BREAST CANCER: ICD-10-CM

## 2022-04-04 DIAGNOSIS — G89.29 CHRONIC RIGHT-SIDED LOW BACK PAIN WITHOUT SCIATICA: ICD-10-CM

## 2022-04-04 DIAGNOSIS — M54.50 CHRONIC RIGHT-SIDED LOW BACK PAIN WITHOUT SCIATICA: ICD-10-CM

## 2022-04-05 ENCOUNTER — TELEPHONE (OUTPATIENT)
Dept: INTERNAL MEDICINE | Facility: CLINIC | Age: 62
End: 2022-04-05
Payer: COMMERCIAL

## 2022-04-06 ENCOUNTER — TELEPHONE (OUTPATIENT)
Dept: INTERNAL MEDICINE | Facility: CLINIC | Age: 62
End: 2022-04-06

## 2022-04-06 NOTE — TELEPHONE ENCOUNTER
Please let Ms. bedoya know that her x-rays of her lumbar spine SI joint and hips are normal.  If she still having discomfort I would like to see how she does with physical therapy.  If she does not improve I would like her to follow with a rheumatologist.  Dr. Guy Hart

## 2022-04-06 NOTE — TELEPHONE ENCOUNTER
Left detailed message on her phone regarding her xray results and instructions. Left message via portal and requesting pt to response or call to us to confirm that she received messages.

## 2022-05-18 RX ORDER — ESCITALOPRAM OXALATE 10 MG/1
TABLET ORAL
Qty: 30 TABLET | Refills: 2 | Status: SHIPPED | OUTPATIENT
Start: 2022-05-18 | End: 2022-09-09

## 2022-05-18 NOTE — TELEPHONE ENCOUNTER
Medicine Refill Request    Last Office: 1/31/2022   Last Consult Visit: Visit date not found  Last Telemedicine Visit: 11/8/2021 Yaquelin Reina MD    Next Appointment: Visit date not found      Current Outpatient Medications:   •  escitalopram (LEXAPRO) 10 mg tablet, Take 1 tablet (10 mg total) by mouth daily., Disp: 90 tablet, Rfl: 0      BP Readings from Last 3 Encounters:   01/31/22 118/80   01/25/21 132/82       Recent Lab results:  Lab Results   Component Value Date    CHOL 235 (H) 09/07/2021   ,   Lab Results   Component Value Date    HDL 40 09/07/2021   ,   Lab Results   Component Value Date    LDLCALC 165 (H) 09/07/2021   ,   Lab Results   Component Value Date    TRIG 161 (H) 09/07/2021        Lab Results   Component Value Date    GLUCOSE 101 (H) 09/07/2021   ,   Lab Results   Component Value Date    HGBA1C 6.1 (H) 10/14/2021         Lab Results   Component Value Date    CREATININE 0.70 09/07/2021       Lab Results   Component Value Date    TSH 2.330 09/07/2021

## 2022-06-05 LAB
ALBUMIN SERPL-MCNC: 4.6 G/DL (ref 3.8–4.9)
ALBUMIN/GLOB SERPL: 2.2 {RATIO} (ref 1.2–2.2)
ALP SERPL-CCNC: 71 IU/L (ref 44–121)
ALT SERPL-CCNC: 60 IU/L (ref 0–32)
AST SERPL-CCNC: 55 IU/L (ref 0–40)
BILIRUB SERPL-MCNC: 0.8 MG/DL (ref 0–1.2)
BUN SERPL-MCNC: 14 MG/DL (ref 8–27)
BUN/CREAT SERPL: 19 (ref 12–28)
CALCIUM SERPL-MCNC: 9.8 MG/DL (ref 8.7–10.3)
CHLORIDE SERPL-SCNC: 102 MMOL/L (ref 96–106)
CHOLEST SERPL-MCNC: 232 MG/DL (ref 100–199)
CO2 SERPL-SCNC: 26 MMOL/L (ref 20–29)
CREAT SERPL-MCNC: 0.72 MG/DL (ref 0.57–1)
EGFRCR SERPLBLD CKD-EPI 2021: 96 ML/MIN/1.73
GLOBULIN SER CALC-MCNC: 2.1 G/DL (ref 1.5–4.5)
GLUCOSE SERPL-MCNC: 98 MG/DL (ref 65–99)
HBA1C MFR BLD: 6.4 % (ref 4.8–5.6)
HDLC SERPL-MCNC: 40 MG/DL
LDLC SERPL CALC-MCNC: 157 MG/DL (ref 0–99)
POTASSIUM SERPL-SCNC: 4.2 MMOL/L (ref 3.5–5.2)
PROT SERPL-MCNC: 6.7 G/DL (ref 6–8.5)
SODIUM SERPL-SCNC: 140 MMOL/L (ref 134–144)
TRIGL SERPL-MCNC: 188 MG/DL (ref 0–149)
TSH SERPL DL<=0.005 MIU/L-ACNC: 3.14 UIU/ML (ref 0.45–4.5)
VLDLC SERPL CALC-MCNC: 35 MG/DL (ref 5–40)

## 2022-06-06 ENCOUNTER — TELEPHONE (OUTPATIENT)
Dept: INTERNAL MEDICINE | Facility: CLINIC | Age: 62
End: 2022-06-06
Payer: COMMERCIAL

## 2022-06-06 DIAGNOSIS — E78.5 HYPERLIPIDEMIA, UNSPECIFIED HYPERLIPIDEMIA TYPE: Primary | ICD-10-CM

## 2022-06-06 DIAGNOSIS — R73.03 PRE-DIABETES: Primary | ICD-10-CM

## 2022-06-06 DIAGNOSIS — R73.01 ELEVATED FASTING GLUCOSE: ICD-10-CM

## 2022-06-06 DIAGNOSIS — R79.89 ELEVATED LIVER FUNCTION TESTS: ICD-10-CM

## 2022-06-06 DIAGNOSIS — R73.03 PRE-DIABETES: ICD-10-CM

## 2022-06-06 DIAGNOSIS — E78.5 HYPERLIPIDEMIA, UNSPECIFIED HYPERLIPIDEMIA TYPE: ICD-10-CM

## 2022-06-06 RX ORDER — METFORMIN HYDROCHLORIDE 500 MG/1
500 TABLET ORAL 2 TIMES DAILY WITH MEALS
Qty: 180 TABLET | Refills: 1 | Status: SHIPPED | OUTPATIENT
Start: 2022-06-06 | End: 2022-10-17 | Stop reason: ALTCHOICE

## 2022-06-06 NOTE — TELEPHONE ENCOUNTER
Left voicemail to call me back and also left portal message including Dr. Zavala's message.  Metformin 500 mg BID daily sent in and labs were ordered to repeat in 4 months.  Script was mailed to her address.

## 2022-06-06 NOTE — TELEPHONE ENCOUNTER
Pt responded on portal message. Pt would like to try [facility] which was ordered.

## 2022-06-06 NOTE — TELEPHONE ENCOUNTER
These let the patient know that her hemoglobin A1c is 6.4.  This is in the diabetic range.  I would like her to consider metformin 500 mg twice a day for better control of her blood sugar.  She also has mildly elevated liver functions related to her fatty liver.  And her cholesterol is not optimal.  She needs to work more aggressively on a low carbohydrate low sugar diet and recheck her blood work in 4 months.  Can refer to [facility] for nutritional counseling

## 2022-09-09 RX ORDER — ESCITALOPRAM OXALATE 10 MG/1
TABLET ORAL
Qty: 30 TABLET | Refills: 2 | Status: SHIPPED | OUTPATIENT
Start: 2022-09-09 | End: 2022-10-03 | Stop reason: SDUPTHER

## 2022-10-03 RX ORDER — ESCITALOPRAM OXALATE 10 MG/1
10 TABLET ORAL
Qty: 90 TABLET | Refills: 3 | Status: SHIPPED | OUTPATIENT
Start: 2022-10-03 | End: 2023-10-03

## 2022-10-03 NOTE — TELEPHONE ENCOUNTER
Ins requires 90 day supply.      Medicine Refill Request    Last Office: 1/31/2022   Last Consult Visit: Visit date not found  Last Telemedicine Visit: 11/8/2021 Yaquelin Reina MD    Next Appointment: Visit date not found      Current Outpatient Medications:     escitalopram (LEXAPRO) 10 mg tablet, TAKE 1 TABLET BY MOUTH EVERY DAY, Disp: 30 tablet, Rfl: 2    metFORMIN (GLUCOPHAGE) 500 mg tablet, Take 1 tablet (500 mg total) by mouth 2 (two) times a day with meals., Disp: 180 tablet, Rfl: 1    SITagliptin (JANUVIA) 100 mg tablet, Take 1 tablet (100 mg total) by mouth daily., Disp: 90 tablet, Rfl: 1      BP Readings from Last 3 Encounters:   01/31/22 118/80   01/25/21 132/82       Recent Lab results:  Lab Results   Component Value Date    CHOL 232 (H) 06/04/2022   ,   Lab Results   Component Value Date    HDL 40 06/04/2022   ,   Lab Results   Component Value Date    LDLCALC 157 (H) 06/04/2022   ,   Lab Results   Component Value Date    TRIG 188 (H) 06/04/2022        Lab Results   Component Value Date    GLUCOSE 98 06/04/2022   ,   Lab Results   Component Value Date    HGBA1C 6.4 (H) 06/04/2022         Lab Results   Component Value Date    CREATININE 0.72 06/04/2022       Lab Results   Component Value Date    TSH 3.140 06/04/2022

## 2022-10-16 ENCOUNTER — TELEPHONE (OUTPATIENT)
Dept: INTERNAL MEDICINE | Facility: CLINIC | Age: 62
End: 2022-10-16

## 2022-10-16 LAB
ALBUMIN SERPL-MCNC: 4.5 G/DL (ref 3.8–4.9)
ALBUMIN/GLOB SERPL: 1.8 {RATIO} (ref 1.2–2.2)
ALP SERPL-CCNC: 79 IU/L (ref 44–121)
ALT SERPL-CCNC: 40 IU/L (ref 0–32)
AST SERPL-CCNC: 32 IU/L (ref 0–40)
BILIRUB SERPL-MCNC: 0.6 MG/DL (ref 0–1.2)
BUN SERPL-MCNC: 14 MG/DL (ref 8–27)
BUN/CREAT SERPL: 18 (ref 12–28)
CALCIUM SERPL-MCNC: 9.8 MG/DL (ref 8.7–10.3)
CHLORIDE SERPL-SCNC: 103 MMOL/L (ref 96–106)
CHOLEST SERPL-MCNC: 241 MG/DL (ref 100–199)
CO2 SERPL-SCNC: 19 MMOL/L (ref 20–29)
CREAT SERPL-MCNC: 0.77 MG/DL (ref 0.57–1)
EGFRCR SERPLBLD CKD-EPI 2021: 88 ML/MIN/1.73
GLOBULIN SER CALC-MCNC: 2.5 G/DL (ref 1.5–4.5)
GLUCOSE SERPL-MCNC: 91 MG/DL (ref 70–99)
HBA1C MFR BLD: 5.8 % (ref 4.8–5.6)
HDLC SERPL-MCNC: 37 MG/DL
LDLC SERPL CALC-MCNC: 172 MG/DL (ref 0–99)
POTASSIUM SERPL-SCNC: 4.4 MMOL/L (ref 3.5–5.2)
PROT SERPL-MCNC: 7 G/DL (ref 6–8.5)
SODIUM SERPL-SCNC: 144 MMOL/L (ref 134–144)
TRIGL SERPL-MCNC: 172 MG/DL (ref 0–149)
VLDLC SERPL CALC-MCNC: 32 MG/DL (ref 5–40)

## 2022-10-16 NOTE — TELEPHONE ENCOUNTER
Please let Ms Alberto know that her hemoglobin A1C is better at 5.8- please clarify if she is taking metformin- it is listed with januvia but she had gi side effects from metformin and I believe she is only on januvia- also her lipid profile is not at goal - being pre diabetic with a cholesterol of 241 and  she should be on a statin - add crestor 10 mg daily hs - please remove metformin from the chart if she is not taking it

## 2022-10-17 DIAGNOSIS — E78.5 HYPERLIPIDEMIA, UNSPECIFIED HYPERLIPIDEMIA TYPE: Primary | ICD-10-CM

## 2022-10-17 RX ORDER — ROSUVASTATIN CALCIUM 10 MG/1
10 TABLET, COATED ORAL DAILY
Qty: 90 TABLET | Refills: 1 | Status: SHIPPED | OUTPATIENT
Start: 2022-10-17 | End: 2023-04-10

## 2022-10-17 NOTE — TELEPHONE ENCOUNTER
Spoke with patient about lab results. Pt stated she only takes Januvia. Metformin made her GI problems.  Pt agreed to take Crestor, but would like to repeat lab about 3months later.  Crestor 10 mg daily HS sent to her pharmacy. Lipid profile ordered to repeat in 3 months. The script was mailed to her home address.  Metformin was removed from her med list.

## 2022-11-29 NOTE — TELEPHONE ENCOUNTER
Medicine Refill Request    Last Office: 1/31/2022   Last Consult Visit: Visit date not found  Last Telemedicine Visit: 11/8/2021 Yaquelin Reina MD    Next Appointment: 12/19/2022      Current Outpatient Medications:     escitalopram (LEXAPRO) 10 mg tablet, Take 1 tablet (10 mg total) by mouth once daily., Disp: 90 tablet, Rfl: 3    rosuvastatin (CRESTOR) 10 mg tablet, Take 1 tablet (10 mg total) by mouth daily., Disp: 90 tablet, Rfl: 1    SITagliptin (JANUVIA) 100 mg tablet, Take 1 tablet (100 mg total) by mouth daily., Disp: 90 tablet, Rfl: 1      BP Readings from Last 3 Encounters:   01/31/22 118/80   01/25/21 132/82       Recent Lab results:  Lab Results   Component Value Date    CHOL 241 (H) 10/15/2022   ,   Lab Results   Component Value Date    HDL 37 (L) 10/15/2022   ,   Lab Results   Component Value Date    LDLCALC 172 (H) 10/15/2022   ,   Lab Results   Component Value Date    TRIG 172 (H) 10/15/2022        Lab Results   Component Value Date    GLUCOSE 91 10/15/2022   ,   Lab Results   Component Value Date    HGBA1C 5.8 (H) 10/15/2022         Lab Results   Component Value Date    CREATININE 0.77 10/15/2022       Lab Results   Component Value Date    TSH 3.140 06/04/2022

## 2022-12-19 ENCOUNTER — OFFICE VISIT (OUTPATIENT)
Dept: INTERNAL MEDICINE | Facility: CLINIC | Age: 62
End: 2022-12-19
Payer: COMMERCIAL

## 2022-12-19 VITALS
SYSTOLIC BLOOD PRESSURE: 116 MMHG | WEIGHT: 197 LBS | DIASTOLIC BLOOD PRESSURE: 74 MMHG | BODY MASS INDEX: 31.66 KG/M2 | TEMPERATURE: 98.6 F | HEART RATE: 68 BPM | OXYGEN SATURATION: 97 % | HEIGHT: 66 IN

## 2022-12-19 DIAGNOSIS — M25.531 RIGHT WRIST PAIN: ICD-10-CM

## 2022-12-19 DIAGNOSIS — R74.8 ELEVATED LIVER ENZYMES: ICD-10-CM

## 2022-12-19 DIAGNOSIS — M25.571 RIGHT ANKLE PAIN, UNSPECIFIED CHRONICITY: ICD-10-CM

## 2022-12-19 DIAGNOSIS — M79.622 LEFT UPPER ARM PAIN: ICD-10-CM

## 2022-12-19 DIAGNOSIS — F32.A MILD DEPRESSION: ICD-10-CM

## 2022-12-19 DIAGNOSIS — E78.5 HYPERLIPIDEMIA, UNSPECIFIED HYPERLIPIDEMIA TYPE: Primary | ICD-10-CM

## 2022-12-19 PROCEDURE — 3008F BODY MASS INDEX DOCD: CPT | Performed by: INTERNAL MEDICINE

## 2022-12-19 PROCEDURE — 99214 OFFICE O/P EST MOD 30 MIN: CPT | Performed by: INTERNAL MEDICINE

## 2022-12-19 ASSESSMENT — ENCOUNTER SYMPTOMS
ABDOMINAL PAIN: 0
NUMBNESS: 0
ARTHRALGIAS: 1
WEAKNESS: 1
MYALGIAS: 0
UNEXPECTED WEIGHT CHANGE: 0
FEVER: 0
JOINT SWELLING: 0
SHORTNESS OF BREATH: 0

## 2022-12-19 NOTE — ASSESSMENT & PLAN NOTE
Started in October after COVID-vaccine, likely tendinitis of the left shoulder.  Will refer to physical therapy.  Naproxen for 3 to 5 days for anti-inflammatory

## 2022-12-19 NOTE — PROGRESS NOTES
Subjective      Patient ID: Mary Taylor is a 62 y.o. female.    HPI    Patient new to me.  Dr. Zavala's patient.  Presents with right wrist pain which started after a fall and August.  Fell forward resulting in bruising down the right side of the arm leg and body.  No redness or swelling of the wrist at that time.  Did not seek immediate medical care.  Developed progressive pain, now getting occasional sharp pains.  Mild weakness.  No paresthesia.  Also having left shoulder pain which she noticed after her COVID-vaccine in October.  Developing decreased range of motion of the left shoulder.  Pain down the bicep muscle and the triceps.  Sometimes feels stiffness and weakness.  Also reports intermittent right ankle pain.  Has been hiking more lately.  No injuries or falls.  Has not tried any medications.  Has been taking Crestor without side effects.  Has labs and follow-up to in February.  No other new/acute concerns.     The following have been reviewed and updated as appropriate in this visit:     Allergies  Meds  Problems         Past Medical History:   Diagnosis Date   • Hyperlipidemia, unspecified 1/25/2021    The 10-year ASCVD risk score ([place] CHANDA Jr., et al., 2013) is: 4.5%   Values used to calculate the score:     Age: 61 years     Sex: Female     Is Non- : No     Diabetic: No     Tobacco smoker: No     Systolic Blood Pressure: 132 mmHg     Is BP treated: No     HDL Cholesterol: 40 mg/dL     Total Cholesterol: 235 mg/dL    • Lipid disorder      Past Surgical History:   Procedure Laterality Date   • HYSTERECTOMY       Family History   Problem Relation Age of Onset   • Diabetes Biological Mother    • Lung cancer Biological Father    • Hypertension Biological Sister    • Diabetes Biological Sister    • Stroke Maternal Grandmother      Social History     Socioeconomic History   • Marital status: Legally      Spouse name: None   • Number of children: None   • Years of  "education: None   • Highest education level: None   Tobacco Use   • Smoking status: Never   • Smokeless tobacco: Never   Vaping Use   • Vaping Use: Never used   Substance and Sexual Activity   • Alcohol use: Yes   • Drug use: Never       Review of Systems   Constitutional: Negative for fever and unexpected weight change.   Respiratory: Negative for shortness of breath.    Cardiovascular: Negative for chest pain.   Gastrointestinal: Negative for abdominal pain.   Musculoskeletal: Positive for arthralgias (L. shoulder pain and decreased ROM, R. wrist, R.ankle pain). Negative for gait problem, joint swelling and myalgias.   Neurological: Positive for weakness (subjective). Negative for numbness.       No Known Allergies  Current Outpatient Medications   Medication Sig Dispense Refill   • SITagliptin (JANUVIA) 100 mg tablet Take 1 tablet (100 mg total) by mouth daily. 90 tablet 1   • escitalopram (LEXAPRO) 10 mg tablet Take 1 tablet (10 mg total) by mouth once daily. 90 tablet 3   • rosuvastatin (CRESTOR) 10 mg tablet Take 1 tablet (10 mg total) by mouth daily. 90 tablet 1     No current facility-administered medications for this visit.       Objective   Vitals:    12/19/22 0805   BP: 116/74   Pulse: 68   Temp: 37 °C (98.6 °F)   SpO2: 97%   Weight: 89.4 kg (197 lb)   Height: 1.676 m (5' 6\")     Body mass index is 31.8 kg/m².    Physical Exam  Constitutional:       Appearance: Normal appearance. She is well-developed and well-nourished.   HENT:      Head: Normocephalic and atraumatic.   Eyes:      General: Lids are normal.      Extraocular Movements: EOM normal.   Cardiovascular:      Rate and Rhythm: Normal rate and regular rhythm.      Heart sounds: Normal heart sounds, S1 normal and S2 normal. No murmur heard.  Pulmonary:      Effort: Pulmonary effort is normal.      Breath sounds: Normal breath sounds. No decreased breath sounds, rhonchi or rales.   Abdominal:      General: Bowel sounds are normal.      Palpations: " Abdomen is soft.      Tenderness: There is no abdominal tenderness.   Musculoskeletal:         General: Tenderness (R.wrist, radial aspect) present. No swelling or deformity. Normal range of motion.      Cervical back: Neck supple.      Comments: Elicited pain with internal and external rotation   Skin:     General: Skin is warm and dry.      Findings: No erythema or rash.   Neurological:      General: No focal deficit present.      Mental Status: She is alert and oriented to person, place, and time.      Cranial Nerves: No cranial nerve deficit.      Motor: Motor strength is normal.      Gait: Gait normal.   Psychiatric:         Mood and Affect: Mood and affect and mood normal.         Behavior: Behavior normal.         Thought Content: Thought content normal.         Cognition and Memory: Cognition and memory normal.         Judgment: Judgment normal.         Assessment/Plan   Problem List Items Addressed This Visit        Musculoskeletal    Right wrist pain     Status post fall in 8/2022.  Likely tendinitis.  Will check x-ray to rule out old fracture         Relevant Orders    X-RAY WRIST RIGHT 3+ VIEWS    Left upper arm pain     Started in October after COVID-vaccine, likely tendinitis of the left shoulder.  Will refer to physical therapy.  Naproxen for 3 to 5 days for anti-inflammatory         Relevant Orders    Ambulatory referral to Physical Therapy    Right ankle pain     Has been hiking more lately.  Possible tendinitis.  Advised to monitor activities for possible strain.  Will refer to physical therapy.         Relevant Orders    Ambulatory referral to Physical Therapy       Mental Health    Mild depression     Stable on Lexapro            Other    Hyperlipidemia, unspecified - Primary     Taking Crestor without side effects.  Has labs and follow-up due in February         Elevated liver enzymes     Will check follow-up labs         Relevant Orders    Comprehensive metabolic panel       Jo Littlejohn,  MD    12/19/2022

## 2023-01-24 LAB
ALBUMIN SERPL-MCNC: 4.4 G/DL (ref 3.8–4.9)
ALBUMIN/GLOB SERPL: 1.7 {RATIO} (ref 1.2–2.2)
ALP SERPL-CCNC: 70 IU/L (ref 44–121)
ALT SERPL-CCNC: 57 IU/L (ref 0–32)
AST SERPL-CCNC: 39 IU/L (ref 0–40)
BILIRUB SERPL-MCNC: 0.7 MG/DL (ref 0–1.2)
BUN SERPL-MCNC: 19 MG/DL (ref 8–27)
BUN/CREAT SERPL: 30 (ref 12–28)
CALCIUM SERPL-MCNC: 9.8 MG/DL (ref 8.7–10.3)
CHLORIDE SERPL-SCNC: 103 MMOL/L (ref 96–106)
CHOLEST SERPL-MCNC: 164 MG/DL (ref 100–199)
CO2 SERPL-SCNC: 23 MMOL/L (ref 20–29)
CREAT SERPL-MCNC: 0.63 MG/DL (ref 0.57–1)
EGFRCR SERPLBLD CKD-EPI 2021: 101 ML/MIN/1.73
GLOBULIN SER CALC-MCNC: 2.6 G/DL (ref 1.5–4.5)
GLUCOSE SERPL-MCNC: 102 MG/DL (ref 70–99)
HDLC SERPL-MCNC: 45 MG/DL
LDLC SERPL CALC-MCNC: 88 MG/DL (ref 0–99)
POTASSIUM SERPL-SCNC: 4.5 MMOL/L (ref 3.5–5.2)
PROT SERPL-MCNC: 7 G/DL (ref 6–8.5)
SODIUM SERPL-SCNC: 140 MMOL/L (ref 134–144)
TRIGL SERPL-MCNC: 180 MG/DL (ref 0–149)
VLDLC SERPL CALC-MCNC: 31 MG/DL (ref 5–40)

## 2023-02-03 ENCOUNTER — OFFICE VISIT (OUTPATIENT)
Dept: INTERNAL MEDICINE | Facility: CLINIC | Age: 63
End: 2023-02-03
Payer: COMMERCIAL

## 2023-02-03 VITALS
TEMPERATURE: 98.9 F | BODY MASS INDEX: 32.3 KG/M2 | WEIGHT: 201 LBS | DIASTOLIC BLOOD PRESSURE: 90 MMHG | SYSTOLIC BLOOD PRESSURE: 128 MMHG | HEIGHT: 66 IN | HEART RATE: 78 BPM | OXYGEN SATURATION: 96 %

## 2023-02-03 DIAGNOSIS — K21.9 CHRONIC GERD: ICD-10-CM

## 2023-02-03 DIAGNOSIS — Z71.3 RESTRICTED DIET: ICD-10-CM

## 2023-02-03 DIAGNOSIS — Z00.01 ABNORMAL PHYSICAL EVALUATION: Primary | ICD-10-CM

## 2023-02-03 DIAGNOSIS — R73.03 PREDIABETES: ICD-10-CM

## 2023-02-03 DIAGNOSIS — G89.29 CHRONIC PAIN OF RIGHT ANKLE: ICD-10-CM

## 2023-02-03 DIAGNOSIS — E66.811 OBESITY, CLASS I, BMI 30-34.9: ICD-10-CM

## 2023-02-03 DIAGNOSIS — M25.571 CHRONIC PAIN OF RIGHT ANKLE: ICD-10-CM

## 2023-02-03 PROCEDURE — 99396 PREV VISIT EST AGE 40-64: CPT | Performed by: FAMILY MEDICINE

## 2023-02-03 PROCEDURE — 3008F BODY MASS INDEX DOCD: CPT | Performed by: FAMILY MEDICINE

## 2023-02-03 ASSESSMENT — ENCOUNTER SYMPTOMS
WEAKNESS: 0
CHEST TIGHTNESS: 0
SINUS PRESSURE: 0
NECK PAIN: 1
FEVER: 0
DYSURIA: 0
SHORTNESS OF BREATH: 0
HEADACHES: 0
COUGH: 1
TREMORS: 0
ARTHRALGIAS: 1
DIZZINESS: 0
WHEEZING: 0
DIAPHORESIS: 0
WOUND: 0
DIARRHEA: 0
ABDOMINAL PAIN: 0
CONSTIPATION: 0
RHINORRHEA: 1
BACK PAIN: 1
FATIGUE: 0
BRUISES/BLEEDS EASILY: 0
PALPITATIONS: 0
VOMITING: 0
APPETITE CHANGE: 0
ADENOPATHY: 0
ABDOMINAL DISTENTION: 0
TROUBLE SWALLOWING: 0
SORE THROAT: 0
CHILLS: 0
BLOOD IN STOOL: 0
NAUSEA: 0
EYE PAIN: 0
HEMATURIA: 0
JOINT SWELLING: 1
SINUS PAIN: 0
FREQUENCY: 0
EYE DISCHARGE: 1
LIGHT-HEADEDNESS: 0
NECK STIFFNESS: 1
DIFFICULTY URINATING: 0

## 2023-02-03 ASSESSMENT — PATIENT HEALTH QUESTIONNAIRE - PHQ9: SUM OF ALL RESPONSES TO PHQ9 QUESTIONS 1 & 2: 0

## 2023-02-03 NOTE — PROGRESS NOTES
Internal Medicine Note       Reason for visit:   Chief Complaint   Patient presents with   • Annual Exam     Pain in shoulders        HPI   Mary Taylor is a 62 y.o. female who presents for routine exam.  She has b/l shoulder pain but sees specialist for that issue.       Past Medical History:   Diagnosis Date   • Hepatic steatosis    • Hyperlipidemia, unspecified 01/25/2021    The 10-year ASCVD risk score (Keyanna ARMAS Jr., et al., 2013) is: 4.5%   Values used to calculate the score:     Age: 61 years     Sex: Female     Is Non- : No     Diabetic: No     Tobacco smoker: No     Systolic Blood Pressure: 132 mmHg     Is BP treated: No     HDL Cholesterol: 40 mg/dL     Total Cholesterol: 235 mg/dL    • Lipid disorder    • Prediabetes 2022     Past Surgical History:   Procedure Laterality Date   • HYSTERECTOMY  2007    ovaries and cervix left intact, uterus removed due to fibroids     Social History     Social History Narrative    Lives alone 90% of time    Pets: 3 cats    Smoke/CO detectors: yes, good batteries    Firearms: No    Sleep: 8-9 hours/avg    Diet: limits red meat and chicken    Caffeine: 3-4 cups tea or coffee/day    Exercise: 1-4 to 6 mile hike/week and gardening in good weather     Family History   Problem Relation Age of Onset   • Diabetes Biological Mother    • Lung cancer Biological Father 43        Non-smoker, possibly mets to lung from other unknown primary   • Hypertension Biological Sister    • Diabetes Biological Sister    • Stroke Maternal Grandmother    • Diabetes Maternal Grandmother    • Diabetes Mother's Sister    • Esophageal cancer Cousin 59        unknown social history     Patient has no known allergies.  Current Outpatient Medications   Medication Sig Dispense Refill   • escitalopram (LEXAPRO) 10 mg tablet Take 1 tablet (10 mg total) by mouth once daily. 90 tablet 3   • rosuvastatin (CRESTOR) 10 mg tablet Take 1 tablet (10 mg total) by mouth daily. 90 tablet 1   •  SITagliptin (JANUVIA) 100 mg tablet Take 1 tablet (100 mg total) by mouth daily. 90 tablet 1     No current facility-administered medications for this visit.       Review of Systems   Constitutional: Negative for appetite change, chills, diaphoresis, fatigue and fever.   HENT: Positive for rhinorrhea (clear, had URI symptoms in 1/2023). Negative for congestion, ear discharge, ear pain, hearing loss, postnasal drip, sinus pressure, sinus pain, sore throat, tinnitus and trouble swallowing.    Eyes: Positive for discharge (dry eye with crusting occasionally) and visual disturbance (floater right eye, under care of ophthalmologist). Negative for pain.   Respiratory: Positive for cough (productive, clear left over from URI symptom in 1/2023). Negative for chest tightness, shortness of breath and wheezing.    Cardiovascular: Negative for chest pain, palpitations and leg swelling.   Gastrointestinal: Negative for abdominal distention, abdominal pain, blood in stool, constipation, diarrhea, nausea and vomiting.   Genitourinary: Negative for difficulty urinating, dysuria, frequency, hematuria, urgency, vaginal bleeding and vaginal discharge.   Musculoskeletal: Positive for arthralgias (bilateral shoulder pain, seeing ortho ), back pain (chronic low back pain, right of spine, worse with exercise, had xray 1 year ago, no radicular pain/symptoms), joint swelling, neck pain (related to SIRVA managed by ortho) and neck stiffness (related to SIRVA).        Right ankle pain, intermittent x years, worsening lately, no recent injuries   Skin: Negative for rash and wound.   Neurological: Negative for dizziness, tremors, weakness, light-headedness and headaches.   Hematological: Negative for adenopathy. Does not bruise/bleed easily.       Objective   Vitals:    02/03/23 0857   BP: 128/90   Pulse: 78   Temp: 37.2 °C (98.9 °F)   SpO2: 96%       Physical Exam  Constitutional:       General: She is not in acute distress.     Appearance:  Normal appearance. She is not ill-appearing, toxic-appearing or diaphoretic.   HENT:      Head: Normocephalic and atraumatic.      Right Ear: Tympanic membrane, ear canal and external ear normal. There is no impacted cerumen.      Left Ear: Tympanic membrane, ear canal and external ear normal. There is no impacted cerumen.      Mouth/Throat:      Mouth: Mucous membranes are moist.      Pharynx: Oropharynx is clear. No oropharyngeal exudate or posterior oropharyngeal erythema.   Eyes:      General: No scleral icterus.        Right eye: No discharge.         Left eye: No discharge.      Conjunctiva/sclera: Conjunctivae normal.   Cardiovascular:      Rate and Rhythm: Normal rate and regular rhythm.      Pulses: Normal pulses.      Heart sounds: Normal heart sounds. No murmur heard.  Pulmonary:      Effort: Pulmonary effort is normal.      Breath sounds: Normal breath sounds. No wheezing.   Abdominal:      Palpations: Abdomen is soft.      Tenderness: There is no abdominal tenderness. There is no right CVA tenderness or left CVA tenderness.   Musculoskeletal:         General: Tenderness (wearing splint on right wrist) and signs of injury present. No swelling.      Cervical back: No rigidity or tenderness.      Right lower leg: No edema.      Left lower leg: No edema.   Lymphadenopathy:      Cervical: No cervical adenopathy.   Skin:     General: Skin is warm and dry.      Coloration: Skin is not jaundiced or pale.      Findings: No erythema or rash.   Neurological:      General: No focal deficit present.      Mental Status: She is alert and oriented to person, place, and time.   Psychiatric:         Mood and Affect: Mood normal.         Behavior: Behavior normal.         Thought Content: Thought content normal.         Judgment: Judgment normal.     COLONOSCOPY 7/2019 = 2 SESSILE POLYPS, REPEAT IN 5-10 YEARS, IN CHART    MAMMOGRAM 3/2022 = NORMAL    PAP/HPV 4/2022 DR. LUJAN = NORMAL/NEGATIVE    Lab Results   Component  Value Date    WBC 6.8 09/07/2021    HGB 12.6 09/07/2021     09/07/2021    CHOL 164 01/23/2023    TRIG 180 (H) 01/23/2023    HDL 45 01/23/2023    ALT 57 (H) 01/23/2023    AST 39 01/23/2023     01/23/2023    K 4.5 01/23/2023    CREATININE 0.63 01/23/2023    TSH 3.140 06/04/2022    HGBA1C 5.8 (H) 10/15/2022           Assessment   Problem List Items Addressed This Visit        Musculoskeletal    Right ankle pain    Relevant Orders    X-RAY ANKLE RIGHT 3+ VIEWS       Endocrine/Metabolic    Obesity, Class I, BMI 30-34.9    Prediabetes    Relevant Orders    Hemoglobin A1c       Other    Restricted diet    Relevant Orders    CBC and differential    Vitamin B12   Other Visit Diagnoses     Abnormal physical evaluation    -  Primary    Chronic GERD        Relevant Orders    Ambulatory referral to Gastroenterology               Chacha Smith DO  2/6/2023  9:45 AM

## 2023-02-03 NOTE — PATIENT INSTRUCTIONS
[facility] is the group we use most often for nutritional counseling for diabetes management.      No fasting needed for labs.      You can try a daily proton pump inhibitor, like prilosec.

## 2023-03-07 LAB
BASOPHILS # BLD AUTO: 0 X10E3/UL (ref 0–0.2)
BASOPHILS NFR BLD AUTO: 0 %
EOSINOPHIL # BLD AUTO: 0.2 X10E3/UL (ref 0–0.4)
EOSINOPHIL NFR BLD AUTO: 3 %
ERYTHROCYTE [DISTWIDTH] IN BLOOD BY AUTOMATED COUNT: 13.4 % (ref 11.7–15.4)
HCT VFR BLD AUTO: 37.4 % (ref 34–46.6)
HGB BLD-MCNC: 12.5 G/DL (ref 11.1–15.9)
IMM GRANULOCYTES # BLD AUTO: 0 X10E3/UL (ref 0–0.1)
IMM GRANULOCYTES NFR BLD AUTO: 0 %
LYMPHOCYTES # BLD AUTO: 2.4 X10E3/UL (ref 0.7–3.1)
LYMPHOCYTES NFR BLD AUTO: 35 %
MCH RBC QN AUTO: 26.9 PG (ref 26.6–33)
MCHC RBC AUTO-ENTMCNC: 33.4 G/DL (ref 31.5–35.7)
MCV RBC AUTO: 81 FL (ref 79–97)
MONOCYTES # BLD AUTO: 0.4 X10E3/UL (ref 0.1–0.9)
MONOCYTES NFR BLD AUTO: 6 %
NEUTROPHILS # BLD AUTO: 3.9 X10E3/UL (ref 1.4–7)
NEUTROPHILS NFR BLD AUTO: 56 %
PLATELET # BLD AUTO: 196 X10E3/UL (ref 150–450)
RBC # BLD AUTO: 4.64 X10E6/UL (ref 3.77–5.28)
WBC # BLD AUTO: 7 X10E3/UL (ref 3.4–10.8)

## 2023-03-08 LAB
HBA1C MFR BLD: 6.2 % (ref 4.8–5.6)
VIT B12 SERPL-MCNC: 446 PG/ML (ref 232–1245)

## 2023-03-10 ENCOUNTER — TELEPHONE (OUTPATIENT)
Dept: INTERNAL MEDICINE | Facility: CLINIC | Age: 63
End: 2023-03-10

## 2023-03-10 NOTE — TELEPHONE ENCOUNTER
Please notify patient her HA1c is elavated, similar to past readings.  We can discuss at her upcoming visit on 3/24.

## 2023-03-24 ENCOUNTER — OFFICE VISIT (OUTPATIENT)
Dept: INTERNAL MEDICINE | Facility: CLINIC | Age: 63
End: 2023-03-24
Payer: COMMERCIAL

## 2023-03-24 VITALS
WEIGHT: 200 LBS | HEART RATE: 87 BPM | HEIGHT: 66 IN | SYSTOLIC BLOOD PRESSURE: 118 MMHG | DIASTOLIC BLOOD PRESSURE: 76 MMHG | OXYGEN SATURATION: 98 % | RESPIRATION RATE: 14 BRPM | BODY MASS INDEX: 32.14 KG/M2

## 2023-03-24 DIAGNOSIS — K76.0 NAFLD (NONALCOHOLIC FATTY LIVER DISEASE): ICD-10-CM

## 2023-03-24 DIAGNOSIS — M54.50 CHRONIC RIGHT-SIDED LOW BACK PAIN WITHOUT SCIATICA: Primary | ICD-10-CM

## 2023-03-24 DIAGNOSIS — G89.29 CHRONIC RIGHT-SIDED LOW BACK PAIN WITHOUT SCIATICA: Primary | ICD-10-CM

## 2023-03-24 DIAGNOSIS — R73.03 PREDIABETES: ICD-10-CM

## 2023-03-24 PROCEDURE — 99214 OFFICE O/P EST MOD 30 MIN: CPT | Performed by: FAMILY MEDICINE

## 2023-03-24 PROCEDURE — 3008F BODY MASS INDEX DOCD: CPT | Performed by: FAMILY MEDICINE

## 2023-03-24 ASSESSMENT — PAIN SCALES - GENERAL: PAINLEVEL: 4

## 2023-03-24 ASSESSMENT — ENCOUNTER SYMPTOMS
ARTHRALGIAS: 1
WEAKNESS: 0
BACK PAIN: 1
NUMBNESS: 0

## 2023-03-24 NOTE — PROGRESS NOTES
Internal Medicine Note       Reason for visit:   Chief Complaint   Patient presents with   • Back Pain     Pt is here for back pain pt would like to get a MRI for lower lumbar back  / pt wants to discuss about her lab results         HPI   Mary Taylor is a 62 y.o. female who presents to follow up on labs and:    Lower right back pain x 6-8 years, constant, no radiation.  Walking long periods makes it worse, gardening/bending over, sitting long periods of time worsens pain.      No radiation of pain, no weakening in leg or hip joint.  No h/o back trauma aside from fall about 2 years ago but pain was not changed as a result of fall.           Past Medical History:   Diagnosis Date   • Hepatic steatosis    • Hyperlipidemia, unspecified 01/25/2021    The 10-year ASCVD risk score (Keyanna ARMAS Jr., et al., 2013) is: 4.5%   Values used to calculate the score:     Age: 61 years     Sex: Female     Is Non- : No     Diabetic: No     Tobacco smoker: No     Systolic Blood Pressure: 132 mmHg     Is BP treated: No     HDL Cholesterol: 40 mg/dL     Total Cholesterol: 235 mg/dL    • Lipid disorder    • Prediabetes 2022     Past Surgical History:   Procedure Laterality Date   • HYSTERECTOMY  2007    ovaries and cervix left intact, uterus removed due to fibroids     Social History     Social History Narrative    Lives alone 90% of time    Pets: 3 cats    Smoke/CO detectors: yes, good batteries    Firearms: No    Sleep: 8-9 hours/avg    Diet: limits red meat and chicken    Caffeine: 3-4 cups tea or coffee/day    Exercise: 1-4 to 6 mile hike/week and gardening in good weather     Family History   Problem Relation Age of Onset   • Diabetes Biological Mother    • Lung cancer Biological Father 43        Non-smoker, possibly mets to lung from other unknown primary   • Hypertension Biological Sister    • Diabetes Biological Sister    • Stroke Maternal Grandmother    • Diabetes Maternal Grandmother    • Diabetes  Mother's Sister    • Esophageal cancer Cousin 59        unknown social history     Patient has no known allergies.  Current Outpatient Medications   Medication Sig Dispense Refill   • escitalopram (LEXAPRO) 10 mg tablet Take 1 tablet (10 mg total) by mouth once daily. 90 tablet 3   • rosuvastatin (CRESTOR) 10 mg tablet Take 1 tablet (10 mg total) by mouth daily. 90 tablet 1   • SITagliptin (JANUVIA) 100 mg tablet Take 1 tablet (100 mg total) by mouth daily. 90 tablet 1     No current facility-administered medications for this visit.       Review of Systems   Musculoskeletal: Positive for arthralgias, back pain and gait problem.   Neurological: Negative for weakness and numbness.       Objective   Vitals:    03/24/23 0803   BP: 118/76   Pulse: 87   Resp: 14   SpO2: 98%       Physical Exam  Constitutional:       General: She is not in acute distress.     Appearance: Normal appearance. She is obese. She is not ill-appearing, toxic-appearing or diaphoretic.   HENT:      Head: Normocephalic and atraumatic.   Eyes:      General: No scleral icterus.        Right eye: No discharge.         Left eye: No discharge.   Musculoskeletal:         General: No swelling, tenderness or deformity. Normal range of motion.      Right lower leg: No edema.      Left lower leg: No edema.      Comments: Negative SLR B/L    No pain in hips with internal/external rotation   Skin:     Coloration: Skin is not jaundiced or pale.      Findings: No erythema, lesion or rash.   Neurological:      Mental Status: She is alert.      Motor: No weakness.      Deep Tendon Reflexes: Reflexes normal.         Lab Results   Component Value Date    WBC 7.0 03/07/2023    HGB 12.5 03/07/2023     03/07/2023    CHOL 164 01/23/2023    TRIG 180 (H) 01/23/2023    HDL 45 01/23/2023    ALT 57 (H) 01/23/2023    AST 39 01/23/2023     01/23/2023    K 4.5 01/23/2023    CREATININE 0.63 01/23/2023    TSH 3.140 06/04/2022    HGBA1C 6.2 (H) 03/07/2023            Assessment   Problem List Items Addressed This Visit        Digestive    NAFLD (nonalcoholic fatty liver disease)     Asked patient to see hepatologist and provided names of 2 local specialists, [facility] and [facility].  Recommended she avoid drinking any alcohol and taking tylenol.             Endocrine/Metabolic    Prediabetes     Referred to RD for dietary therapy on last visit.  Mary will follow up on that referral.  CPT with Januvia for now.  She did not tolerate metformin due to GI issues.  May consider looking into alternative therapy in future depending on hepatology consult outcome.             Other    Chronic right-sided low back pain without sciatica - Primary     Last L-spine xrays in 2022 were WNL.  Right Hip xrays were also WNL.  Etiology of pain unclear.  Ordered MRI L-spine.  If normal or insurance does not approve, will refer to ortho.           Relevant Orders    MRI LUMBAR SPINE WITHOUT CONTRAST    I spent 35 minutes on visit,  reviewing chart, discussing above, examining patient, forming a plan, entering orders, and documentation.        Chacha Smith DO  3/24/2023  8:13 AM

## 2023-03-24 NOTE — ASSESSMENT & PLAN NOTE
Asked patient to see hepatologist and provided names of 2 local specialists, [facility] and [facility].  Recommended she avoid drinking any alcohol and taking tylenol.

## 2023-03-24 NOTE — PATIENT INSTRUCTIONS
Reynaldo Quiles MD, [facility] is the hepatologist at [facility] and his number is [phone]    Jeffry Lynn MD is the other hepatologist's name affiliated with [facility] [phone]    These are liver specialists and I recommend you see one for evaluation of your fatty liver.

## 2023-03-24 NOTE — ASSESSMENT & PLAN NOTE
Last L-spine xrays in 2022 were WNL.  Right Hip xrays were also WNL.  Etiology of pain unclear.  Ordered MRI L-spine.  If normal or insurance does not approve, will refer to ortho.

## 2023-03-24 NOTE — ASSESSMENT & PLAN NOTE
Referred to RD for dietary therapy on last visit.  Mary will follow up on that referral.  CPT with Januvia for now.  She did not tolerate metformin due to GI issues.  May consider looking into alternative therapy in future depending on hepatology consult outcome.

## 2023-03-30 ENCOUNTER — TELEPHONE (OUTPATIENT)
Dept: INTERNAL MEDICINE | Facility: CLINIC | Age: 63
End: 2023-03-30

## 2023-03-30 DIAGNOSIS — G89.29 CHRONIC RIGHT-SIDED LOW BACK PAIN WITHOUT SCIATICA: Primary | ICD-10-CM

## 2023-03-30 DIAGNOSIS — M54.50 CHRONIC RIGHT-SIDED LOW BACK PAIN WITHOUT SCIATICA: Primary | ICD-10-CM

## 2023-03-30 NOTE — TELEPHONE ENCOUNTER
Please let patient know her insurance did not approve MRI L-spine.  This is based on her normal Lumbar Xray in 2022 and exam.  I can refer her to [facility] for further evaluation if she would like.

## 2023-04-04 DIAGNOSIS — M79.671 RIGHT FOOT PAIN: Primary | ICD-10-CM

## 2023-04-07 ENCOUNTER — OFFICE VISIT (OUTPATIENT)
Dept: SURGERY | Facility: CLINIC | Age: 63
End: 2023-04-07
Payer: COMMERCIAL

## 2023-04-07 VITALS — WEIGHT: 200 LBS | HEIGHT: 66 IN | BODY MASS INDEX: 32.14 KG/M2

## 2023-04-07 DIAGNOSIS — G89.29 CHRONIC PAIN OF RIGHT ANKLE: Primary | ICD-10-CM

## 2023-04-07 DIAGNOSIS — M25.571 CHRONIC PAIN OF RIGHT ANKLE: Primary | ICD-10-CM

## 2023-04-07 PROCEDURE — 99213 OFFICE O/P EST LOW 20 MIN: CPT | Performed by: FAMILY MEDICINE

## 2023-04-07 PROCEDURE — 3008F BODY MASS INDEX DOCD: CPT | Performed by: FAMILY MEDICINE

## 2023-04-07 NOTE — PROGRESS NOTES
Sports Medicine New Patient Progress Note       Primary Care Provider  Yaquelin Reina MD    Referring Provider   Skinny Reina*      History of Present Illness   This is a 62 y.o. female presenting with a chief complaint of   Chief Complaint   Patient presents with   • Right foot pain     Chronic pain worsening more recently.   When pain is at its worse she can barely use the foot, then resolves on its own.       Has been having chronic sporadic pain over anterolateral ankle. At times she has to walk on her toes. Usually happens with walking and definitely worse with pushing off while running. Sometimes it could just come on while walking to the car. No swelling. No instability. No weakness if pain is not there.   Had a haglunds shaving about 12-13 years ago but ankle pain was present beforehand. As a kid, foot hyperflexed and hobbled around for a few weeks.     Past Medical History:   Diagnosis Date   • Hepatic steatosis    • Hyperlipidemia, unspecified 01/25/2021    The 10-year ASCVD risk score ([place] CHANDA Jr., et al., 2013) is: 4.5%   Values used to calculate the score:     Age: 61 years     Sex: Female     Is Non- : No     Diabetic: No     Tobacco smoker: No     Systolic Blood Pressure: 132 mmHg     Is BP treated: No     HDL Cholesterol: 40 mg/dL     Total Cholesterol: 235 mg/dL    • Lipid disorder    • Prediabetes 2022     Past Surgical History:   Procedure Laterality Date   • HYSTERECTOMY  2007    ovaries and cervix left intact, uterus removed due to fibroids     Current Outpatient Medications on File Prior to Visit   Medication Sig Dispense Refill   • escitalopram (LEXAPRO) 10 mg tablet Take 1 tablet (10 mg total) by mouth once daily. 90 tablet 3   • rosuvastatin (CRESTOR) 10 mg tablet Take 1 tablet (10 mg total) by mouth daily. 90 tablet 1   • SITagliptin (JANUVIA) 100 mg tablet Take 1 tablet (100 mg total) by mouth daily. 90 tablet 1     No current  facility-administered medications on file prior to visit.     No Known Allergies  Family History   Problem Relation Age of Onset   • Diabetes Biological Mother    • Lung cancer Biological Father 43        Non-smoker, possibly mets to lung from other unknown primary   • Hypertension Biological Sister    • Diabetes Biological Sister    • Stroke Maternal Grandmother    • Diabetes Maternal Grandmother    • Diabetes Mother's Sister    • Esophageal cancer Cousin 59        unknown social history       PHYSICAL EXAM:   There were no vitals filed for this visit.  GENERAL: NAD, comfortable, WDWN   RESP: Unlabored breathing   MSK:   Right Ankle / Foot exam     INSPECTION : no atrophic changes or ulcerative lesions. No swelling at medial or lateral malleolus. +haglunds     PALPATION:    no tenderness over medial calcaneal tubercle, plantar fascia   no medial malleolar tenderness   no lateral malleolar tenderness   no navicular tenderness   no tenderness base 5th metatarsals,    no posterior aspect of the ankle   no other bony tenderness    no Achilles tenderness   no tenderness along ATFL   no tenderness along CFL   no tenderness along PTFL   Very mild TTP over extensor tendons at level of joint line.     ROM: Full ROM in all directions except dorsiflexion      STRENGTH TESTIN/5 muscle strength resisted inversion   5/5 muscle strength eversion   5/5 muscle strength plantarflexion    5/5 muscle strength dorsiflexion.     NEUROVASCULAR: Sensation to light touch intact and equal     SPECIAL TESTS:   Neg calf squeeze/proximal squeeze   Neg calcaneal squeeze,    Neg metatarsal squeeze   Achilles tendon palpably intact   Neg anterior drawer   Neg inversion stress test/Talar tilt     IMAGING:   The following images we reviewed by myself and discussed with patient   Tucker Griffihts MD - 2023   Formatting of this note might be different from the original.   PROCEDURE:  XR ANKLE RT 3 VWS OR MORE   TECHNIQUE: 4 views of the  right ankle     PROVIDED INDICATION:   ADDITIONAL INFORMATION: Chronic foot pain     COMPARISON:  None     FINDINGS:     Bones: No fracture or dislocation is present. Large Janell deformity. Mild retrocalcaneal soft tissue swelling.     Joints: The joint spaces are normal.     Soft tissue: Normal.     Other significant findings: A suture anchor is present in the calcaneus.     IMPRESSION:     Large Janell deformity. Mild retrocalcaneal soft tissue swelling.     Dictation By:    Tucker Mendoza MD    This report has been electronically signed by:    Tucker Mendoza MD    2/11/2023 9:23 AM     PROCEDURES:   none     ASSESSMENT/PLAN:   This is a 62 y.o. female p/f sporadic R anterolateral ankle pain worse with exertion. Exam with decreased dorsiflexion and discomfort over area where extensor tendons are at level of joint line. Extensor tendinopathy vs anterior ankle impingement vs less likely ankle OA. Will get physician guided HEP started. IF no improvement, would proceed with MRI given chronicity.     Chronic pain of right ankle  (primary encounter diagnosis)        Follow up: Return in about 6 weeks (around 5/19/2023).    Diagnosis and treatment options were discussed in detail with the patient who is in agreement with the plan.       Patient/Caregiver verbalizes understanding of teaching and instructions     F/U:  See instructions     Latoya Gates MD   [facility]

## 2023-04-10 RX ORDER — ROSUVASTATIN CALCIUM 10 MG/1
TABLET, COATED ORAL
Qty: 30 TABLET | Refills: 5 | Status: SHIPPED | OUTPATIENT
Start: 2023-04-10 | End: 2023-05-08 | Stop reason: SDUPTHER

## 2023-04-10 NOTE — TELEPHONE ENCOUNTER
Medicine Refill Request    Last Office: 3/24/2023   Last Consult Visit: Visit date not found  Last Telemedicine Visit: 11/8/2021 Yaquelin Reina MD    Next Appointment: Visit date not found      Current Outpatient Medications:   •  escitalopram (LEXAPRO) 10 mg tablet, Take 1 tablet (10 mg total) by mouth once daily., Disp: 90 tablet, Rfl: 3  •  rosuvastatin (CRESTOR) 10 mg tablet, Take 1 tablet (10 mg total) by mouth daily., Disp: 90 tablet, Rfl: 1  •  SITagliptin (JANUVIA) 100 mg tablet, Take 1 tablet (100 mg total) by mouth daily., Disp: 90 tablet, Rfl: 1      BP Readings from Last 3 Encounters:   03/24/23 118/76   02/03/23 128/90   12/19/22 116/74       Recent Lab results:  Lab Results   Component Value Date    CHOL 164 01/23/2023   ,   Lab Results   Component Value Date    HDL 45 01/23/2023   ,   Lab Results   Component Value Date    LDLCALC 88 01/23/2023   ,   Lab Results   Component Value Date    TRIG 180 (H) 01/23/2023        Lab Results   Component Value Date    GLUCOSE 102 (H) 01/23/2023   ,   Lab Results   Component Value Date    HGBA1C 6.2 (H) 03/07/2023         Lab Results   Component Value Date    CREATININE 0.63 01/23/2023       Lab Results   Component Value Date    TSH 3.140 06/04/2022

## 2023-05-08 RX ORDER — ROSUVASTATIN CALCIUM 10 MG/1
10 TABLET, COATED ORAL DAILY
Qty: 90 TABLET | Refills: 3 | Status: SHIPPED | OUTPATIENT
Start: 2023-05-08 | End: 2024-04-17 | Stop reason: SDUPTHER

## 2023-05-08 NOTE — TELEPHONE ENCOUNTER
Requesting Rx re-sent for 90 days supply        Medicine Refill Request    Last Office: 3/24/2023   Last Consult Visit: Visit date not found  Last Telemedicine Visit: 11/8/2021 Yaquelin Reina MD    Next Appointment: Visit date not found      Current Outpatient Medications:   •  escitalopram (LEXAPRO) 10 mg tablet, Take 1 tablet (10 mg total) by mouth once daily., Disp: 90 tablet, Rfl: 3  •  rosuvastatin (CRESTOR) 10 mg tablet, TAKE 1 TABLET BY MOUTH EVERY DAY, Disp: 30 tablet, Rfl: 5  •  SITagliptin (JANUVIA) 100 mg tablet, Take 1 tablet (100 mg total) by mouth daily., Disp: 90 tablet, Rfl: 1      BP Readings from Last 3 Encounters:   03/24/23 118/76   02/03/23 128/90   12/19/22 116/74       Recent Lab results:  Lab Results   Component Value Date    CHOL 164 01/23/2023   ,   Lab Results   Component Value Date    HDL 45 01/23/2023   ,   Lab Results   Component Value Date    LDLCALC 88 01/23/2023   ,   Lab Results   Component Value Date    TRIG 180 (H) 01/23/2023        Lab Results   Component Value Date    GLUCOSE 102 (H) 01/23/2023   ,   Lab Results   Component Value Date    HGBA1C 6.2 (H) 03/07/2023         Lab Results   Component Value Date    CREATININE 0.63 01/23/2023       Lab Results   Component Value Date    TSH 3.140 06/04/2022

## 2023-07-11 RX ORDER — SITAGLIPTIN 100 MG/1
100 TABLET, FILM COATED ORAL DAILY
Qty: 90 TABLET | Refills: 1 | Status: SHIPPED | OUTPATIENT
Start: 2023-07-11 | End: 2023-11-16

## 2023-07-11 NOTE — TELEPHONE ENCOUNTER
Medicine Refill Request    Last Office: 3/24/2023   Last Consult Visit: Visit date not found  Last Telemedicine Visit: 11/8/2021 Yaquelin Reina MD    Next Appointment: Visit date not found      Current Outpatient Medications:   •  rosuvastatin (CRESTOR) 10 mg tablet, Take 1 tablet (10 mg total) by mouth daily., Disp: 90 tablet, Rfl: 3  •  escitalopram (LEXAPRO) 10 mg tablet, Take 1 tablet (10 mg total) by mouth once daily., Disp: 90 tablet, Rfl: 3  •  SITagliptin (JANUVIA) 100 mg tablet, Take 1 tablet (100 mg total) by mouth daily., Disp: 90 tablet, Rfl: 1      BP Readings from Last 3 Encounters:   03/24/23 118/76   02/03/23 128/90   12/19/22 116/74       Recent Lab results:  Lab Results   Component Value Date    CHOL 164 01/23/2023   ,   Lab Results   Component Value Date    HDL 45 01/23/2023   ,   Lab Results   Component Value Date    LDLCALC 88 01/23/2023   ,   Lab Results   Component Value Date    TRIG 180 (H) 01/23/2023        Lab Results   Component Value Date    GLUCOSE 102 (H) 01/23/2023   ,   Lab Results   Component Value Date    HGBA1C 6.2 (H) 03/07/2023         Lab Results   Component Value Date    CREATININE 0.63 01/23/2023       Lab Results   Component Value Date    TSH 3.140 06/04/2022

## 2023-08-15 ENCOUNTER — TELEPHONE (OUTPATIENT)
Dept: SLEEP MEDICINE | Facility: HOSPITAL | Age: 63
End: 2023-08-15
Payer: COMMERCIAL

## 2023-08-15 NOTE — TELEPHONE ENCOUNTER
Spoke with patient about scheduling  sleep study. Patient will call back after appointment with ENT next week.    [facility]    [phone]

## 2023-11-01 RX ORDER — ESCITALOPRAM OXALATE 10 MG/1
10 TABLET ORAL DAILY
Qty: 30 TABLET | Refills: 3 | OUTPATIENT
Start: 2023-11-01

## 2023-11-01 NOTE — TELEPHONE ENCOUNTER
Medicine Refill Request    Last Office Visit: Visit date not found   Last Consult Visit: Visit date not found  Last Telemedicine Visit: Visit date not found    Next Appointment: Visit date not found      Current Outpatient Medications:     rosuvastatin (CRESTOR) 10 mg tablet, Take 1 tablet (10 mg total) by mouth daily., Disp: 90 tablet, Rfl: 3    escitalopram (LEXAPRO) 10 mg tablet, TAKE 1 TABLET BY MOUTH EVERY DAY, Disp: 30 tablet, Rfl: 3    JANUVIA 100 mg tablet, TAKE 1 TABLET BY MOUTH EVERY DAY, Disp: 90 tablet, Rfl: 1      BP Readings from Last 3 Encounters:   03/24/23 118/76   02/03/23 128/90   12/19/22 116/74       Recent Lab results:  Lab Results   Component Value Date    CHOL 164 01/23/2023   ,   Lab Results   Component Value Date    HDL 45 01/23/2023   ,   Lab Results   Component Value Date    LDLCALC 88 01/23/2023   ,   Lab Results   Component Value Date    TRIG 180 (H) 01/23/2023        Lab Results   Component Value Date    GLUCOSE 102 (H) 01/23/2023   ,   Lab Results   Component Value Date    HGBA1C 6.2 (H) 03/07/2023         Lab Results   Component Value Date    CREATININE 0.63 01/23/2023       Lab Results   Component Value Date    TSH 3.140 06/04/2022           Lab Results   Component Value Date    HGBA1C 6.2 (H) 03/07/2023

## 2023-11-16 RX ORDER — SITAGLIPTIN 100 MG/1
100 TABLET, FILM COATED ORAL DAILY
Qty: 30 TABLET | Refills: 2 | Status: SHIPPED | OUTPATIENT
Start: 2023-11-16 | End: 2023-11-27 | Stop reason: SDUPTHER

## 2023-11-27 NOTE — TELEPHONE ENCOUNTER
Please re-send for 90 day supply.              Medicine Refill Request    Last Office Visit: 3/24/2023   Last Consult Visit: Visit date not found  Last Telemedicine Visit: 11/8/2021 Yaquelin Reina MD    Next Appointment: Visit date not found      Current Outpatient Medications:     rosuvastatin (CRESTOR) 10 mg tablet, Take 1 tablet (10 mg total) by mouth daily., Disp: 90 tablet, Rfl: 3    escitalopram (LEXAPRO) 10 mg tablet, TAKE 1 TABLET BY MOUTH EVERY DAY, Disp: 30 tablet, Rfl: 3    JANUVIA 100 mg tablet, TAKE 1 TABLET BY MOUTH EVERY DAY, Disp: 30 tablet, Rfl: 2      BP Readings from Last 3 Encounters:   03/24/23 118/76   02/03/23 128/90   12/19/22 116/74       Recent Lab results:  Lab Results   Component Value Date    CHOL 164 01/23/2023   ,   Lab Results   Component Value Date    HDL 45 01/23/2023   ,   Lab Results   Component Value Date    LDLCALC 88 01/23/2023   ,   Lab Results   Component Value Date    TRIG 180 (H) 01/23/2023        Lab Results   Component Value Date    GLUCOSE 102 (H) 01/23/2023   ,   Lab Results   Component Value Date    HGBA1C 6.2 (H) 03/07/2023         Lab Results   Component Value Date    CREATININE 0.63 01/23/2023       Lab Results   Component Value Date    TSH 3.140 06/04/2022           Lab Results   Component Value Date    HGBA1C 6.2 (H) 03/07/2023

## 2023-11-28 RX ORDER — ESCITALOPRAM OXALATE 10 MG/1
10 TABLET ORAL DAILY
Qty: 90 TABLET | Refills: 3 | Status: SHIPPED | OUTPATIENT
Start: 2023-11-28 | End: 2024-02-23

## 2024-02-01 ENCOUNTER — TELEPHONE (OUTPATIENT)
Dept: INTERNAL MEDICINE | Facility: CLINIC | Age: 64
End: 2024-02-01

## 2024-02-07 DIAGNOSIS — Z00.00 PHYSICAL EXAM: Primary | ICD-10-CM

## 2024-02-07 DIAGNOSIS — E78.5 HYPERLIPIDEMIA, UNSPECIFIED HYPERLIPIDEMIA TYPE: ICD-10-CM

## 2024-02-07 DIAGNOSIS — R73.03 PREDIABETES: ICD-10-CM

## 2024-02-07 DIAGNOSIS — R74.8 ELEVATED LIVER ENZYMES: ICD-10-CM

## 2024-02-07 NOTE — TELEPHONE ENCOUNTER
The patient called stating that she will be traveling due to a death in her family and will not be able to come into the office for her physical until 1st week of April.  The patient scheduled her physical appt with [facility] for 4/5/24 but would like a script for blood work to do before she leaves for [place] as she stated she has a very short window to do this here.  She will be coming back the first week of April when she is scheduled for her physical and will not have time to do her blood work.  I told the patient that I would have the orders placed and sent to her portal so she can do this.  I told the patient that we would not be contacting her with results unless there is an urgent problem that cannot wait until her appt on 4/5/24.  The patient asked that we do a CMP as she needs AST and ALT included, a lipid panel, hemoglobin A1C for prediabetes as she stated she is on Januvia and wants to see what her level is since taking this, and any other preventive exam labs needed.  Please send to patient portal.  Patient's insurance is [employer] so please place orders for [facility] and send to portal.  The pabient verbalized understanding.

## 2024-02-21 NOTE — TELEPHONE ENCOUNTER
Medicine Refill Request    Last Office Visit: 3/24/2023   Last Consult Visit: Visit date not found  Last Telemedicine Visit: 11/8/2021 Yaquelin Reina MD    Next Appointment: 4/5/2024      Current Outpatient Medications:   •  rosuvastatin (CRESTOR) 10 mg tablet, Take 1 tablet (10 mg total) by mouth daily., Disp: 90 tablet, Rfl: 3  •  escitalopram (LEXAPRO) 10 mg tablet, Take 1 tablet (10 mg total) by mouth daily., Disp: 90 tablet, Rfl: 3  •  SITagliptin phosphate (JANUVIA) 100 mg tablet, Take 1 tablet (100 mg total) by mouth daily., Disp: 90 tablet, Rfl: 3      BP Readings from Last 3 Encounters:   03/24/23 118/76   02/03/23 128/90   12/19/22 116/74       Recent Lab results:  Lab Results   Component Value Date    CHOL 164 01/23/2023   ,   Lab Results   Component Value Date    HDL 45 01/23/2023   ,   Lab Results   Component Value Date    LDLCALC 88 01/23/2023   ,   Lab Results   Component Value Date    TRIG 180 (H) 01/23/2023        Lab Results   Component Value Date    GLUCOSE 102 (H) 01/23/2023   ,   Lab Results   Component Value Date    HGBA1C 6.2 (H) 03/07/2023         Lab Results   Component Value Date    CREATININE 0.63 01/23/2023       Lab Results   Component Value Date    TSH 3.140 06/04/2022           Lab Results   Component Value Date    HGBA1C 6.2 (H) 03/07/2023

## 2024-02-23 RX ORDER — ESCITALOPRAM OXALATE 10 MG/1
10 TABLET ORAL DAILY
Qty: 30 TABLET | Refills: 2 | Status: SHIPPED | OUTPATIENT
Start: 2024-02-23

## 2024-04-05 ENCOUNTER — OFFICE VISIT (OUTPATIENT)
Dept: INTERNAL MEDICINE | Facility: CLINIC | Age: 64
End: 2024-04-05
Payer: COMMERCIAL

## 2024-04-05 VITALS
HEIGHT: 66 IN | SYSTOLIC BLOOD PRESSURE: 126 MMHG | HEART RATE: 64 BPM | TEMPERATURE: 98.6 F | OXYGEN SATURATION: 98 % | DIASTOLIC BLOOD PRESSURE: 86 MMHG | BODY MASS INDEX: 31.5 KG/M2 | WEIGHT: 196 LBS

## 2024-04-05 DIAGNOSIS — E78.5 HYPERLIPIDEMIA, UNSPECIFIED HYPERLIPIDEMIA TYPE: ICD-10-CM

## 2024-04-05 DIAGNOSIS — R21 RASH: ICD-10-CM

## 2024-04-05 DIAGNOSIS — R73.03 PREDIABETES: ICD-10-CM

## 2024-04-05 DIAGNOSIS — Z00.00 PREVENTATIVE HEALTH CARE: ICD-10-CM

## 2024-04-05 DIAGNOSIS — Z00.00 PHYSICAL EXAM: Primary | ICD-10-CM

## 2024-04-05 LAB
ALBUMIN SERPL-MCNC: 4.4 G/DL (ref 3.9–4.9)
ALBUMIN/GLOB SERPL: 1.8 {RATIO} (ref 1.2–2.2)
ALP SERPL-CCNC: 81 IU/L (ref 44–121)
ALT SERPL-CCNC: 38 IU/L (ref 0–32)
AST SERPL-CCNC: 34 IU/L (ref 0–40)
BASOPHILS # BLD AUTO: 0 X10E3/UL (ref 0–0.2)
BASOPHILS NFR BLD AUTO: 1 %
BILIRUB SERPL-MCNC: 0.9 MG/DL (ref 0–1.2)
BUN SERPL-MCNC: 18 MG/DL (ref 8–27)
BUN/CREAT SERPL: 27 (ref 12–28)
CALCIUM SERPL-MCNC: 9.5 MG/DL (ref 8.7–10.3)
CHLORIDE SERPL-SCNC: 102 MMOL/L (ref 96–106)
CHOLEST SERPL-MCNC: 171 MG/DL (ref 100–199)
CO2 SERPL-SCNC: 21 MMOL/L (ref 20–29)
CREAT SERPL-MCNC: 0.67 MG/DL (ref 0.57–1)
EGFRCR SERPLBLD CKD-EPI 2021: 99 ML/MIN/1.73
EOSINOPHIL # BLD AUTO: 0.2 X10E3/UL (ref 0–0.4)
EOSINOPHIL NFR BLD AUTO: 3 %
ERYTHROCYTE [DISTWIDTH] IN BLOOD BY AUTOMATED COUNT: 13.8 % (ref 11.7–15.4)
GLOBULIN SER CALC-MCNC: 2.4 G/DL (ref 1.5–4.5)
GLUCOSE SERPL-MCNC: 94 MG/DL (ref 70–99)
HBA1C MFR BLD: 6.2 % (ref 4.8–5.6)
HCT VFR BLD AUTO: 36.6 % (ref 34–46.6)
HDLC SERPL-MCNC: 42 MG/DL
HGB BLD-MCNC: 12.1 G/DL (ref 11.1–15.9)
IMM GRANULOCYTES # BLD AUTO: 0 X10E3/UL (ref 0–0.1)
IMM GRANULOCYTES NFR BLD AUTO: 0 %
LDLC SERPL CALC-MCNC: 102 MG/DL (ref 0–99)
LYMPHOCYTES # BLD AUTO: 2.3 X10E3/UL (ref 0.7–3.1)
LYMPHOCYTES NFR BLD AUTO: 30 %
MCH RBC QN AUTO: 26.8 PG (ref 26.6–33)
MCHC RBC AUTO-ENTMCNC: 33.1 G/DL (ref 31.5–35.7)
MCV RBC AUTO: 81 FL (ref 79–97)
MONOCYTES # BLD AUTO: 0.5 X10E3/UL (ref 0.1–0.9)
MONOCYTES NFR BLD AUTO: 7 %
NEUTROPHILS # BLD AUTO: 4.5 X10E3/UL (ref 1.4–7)
NEUTROPHILS NFR BLD AUTO: 59 %
PLATELET # BLD AUTO: 233 X10E3/UL (ref 150–450)
POTASSIUM SERPL-SCNC: 4.1 MMOL/L (ref 3.5–5.2)
PROT SERPL-MCNC: 6.8 G/DL (ref 6–8.5)
RBC # BLD AUTO: 4.52 X10E6/UL (ref 3.77–5.28)
SODIUM SERPL-SCNC: 141 MMOL/L (ref 134–144)
T4 FREE SERPL-MCNC: 0.93 NG/DL (ref 0.82–1.77)
TRIGL SERPL-MCNC: 153 MG/DL (ref 0–149)
TSH SERPL DL<=0.005 MIU/L-ACNC: 1.25 UIU/ML (ref 0.45–4.5)
VLDLC SERPL CALC-MCNC: 27 MG/DL (ref 5–40)
WBC # BLD AUTO: 7.5 X10E3/UL (ref 3.4–10.8)

## 2024-04-05 PROCEDURE — 99396 PREV VISIT EST AGE 40-64: CPT | Mod: 25 | Performed by: NURSE PRACTITIONER

## 2024-04-05 PROCEDURE — 3008F BODY MASS INDEX DOCD: CPT | Performed by: NURSE PRACTITIONER

## 2024-04-05 PROCEDURE — 99214 OFFICE O/P EST MOD 30 MIN: CPT | Performed by: NURSE PRACTITIONER

## 2024-04-05 RX ORDER — DIAPER,BRIEF,INFANT-TODD,DISP
EACH MISCELLANEOUS 2 TIMES DAILY
Qty: 30 G | Refills: 0 | Status: SHIPPED | OUTPATIENT
Start: 2024-04-05 | End: 2025-04-05

## 2024-04-05 RX ORDER — LORAZEPAM 0.5 MG/1
0.5 TABLET ORAL EVERY 8 HOURS PRN
Qty: 30 TABLET | Refills: 0 | Status: SHIPPED | OUTPATIENT
Start: 2024-04-05

## 2024-04-05 SDOH — ECONOMIC STABILITY: TRANSPORTATION INSECURITY
IN THE PAST 12 MONTHS, HAS LACK OF TRANSPORTATION KEPT YOU FROM MEETINGS, WORK, OR FROM GETTING THINGS NEEDED FOR DAILY LIVING?: NO

## 2024-04-05 SDOH — HEALTH STABILITY: PHYSICAL HEALTH: ON AVERAGE, HOW MANY MINUTES DO YOU ENGAGE IN EXERCISE AT THIS LEVEL?: 90 MIN

## 2024-04-05 SDOH — ECONOMIC STABILITY: FOOD INSECURITY: WITHIN THE PAST 12 MONTHS, THE FOOD YOU BOUGHT JUST DIDN'T LAST AND YOU DIDN'T HAVE MONEY TO GET MORE.: NEVER TRUE

## 2024-04-05 SDOH — ECONOMIC STABILITY: FOOD INSECURITY: WITHIN THE PAST 12 MONTHS, YOU WORRIED THAT YOUR FOOD WOULD RUN OUT BEFORE YOU GOT MONEY TO BUY MORE.: NEVER TRUE

## 2024-04-05 SDOH — HEALTH STABILITY: PHYSICAL HEALTH: ON AVERAGE, HOW MANY DAYS PER WEEK DO YOU ENGAGE IN MODERATE TO STRENUOUS EXERCISE (LIKE A BRISK WALK)?: 2 DAYS

## 2024-04-05 SDOH — ECONOMIC STABILITY: INCOME INSECURITY: IN THE LAST 12 MONTHS, WAS THERE A TIME WHEN YOU WERE NOT ABLE TO PAY THE MORTGAGE OR RENT ON TIME?: NO

## 2024-04-05 SDOH — ECONOMIC STABILITY: TRANSPORTATION INSECURITY
IN THE PAST 12 MONTHS, HAS THE LACK OF TRANSPORTATION KEPT YOU FROM MEDICAL APPOINTMENTS OR FROM GETTING MEDICATIONS?: NO

## 2024-04-05 ASSESSMENT — SOCIAL DETERMINANTS OF HEALTH (SDOH)
DO YOU BELONG TO ANY CLUBS OR ORGANIZATIONS SUCH AS CHURCH GROUPS UNIONS, FRATERNAL OR ATHLETIC GROUPS, OR SCHOOL GROUPS?: YES
WITHIN THE LAST YEAR, HAVE YOU BEEN AFRAID OF YOUR PARTNER OR EX-PARTNER?: NO
HOW OFTEN DO YOU ATTEND CHURCH OR RELIGIOUS SERVICES?: NEVER
HOW OFTEN DO YOU ATTENT MEETINGS OF THE CLUB OR ORGANIZATION YOU BELONG TO?: MORE THAN 4 TIMES PER YEAR
WITHIN THE LAST YEAR, HAVE YOU BEEN HUMILIATED OR EMOTIONALLY ABUSED IN OTHER WAYS BY YOUR PARTNER OR EX-PARTNER?: NO
HOW OFTEN DO YOU GET TOGETHER WITH FRIENDS OR RELATIVES?: THREE TIMES A WEEK
HOW HARD IS IT FOR YOU TO PAY FOR THE VERY BASICS LIKE FOOD, HOUSING, MEDICAL CARE, AND HEATING?: NOT HARD AT ALL
IN A TYPICAL WEEK, HOW MANY TIMES DO YOU TALK ON THE PHONE WITH FAMILY, FRIENDS, OR NEIGHBORS?: MORE THAN THREE TIMES A WEEK
WITHIN THE LAST YEAR, HAVE TO BEEN RAPED OR FORCED TO HAVE ANY KIND OF SEXUAL ACTIVITY BY YOUR PARTNER OR EX-PARTNER?: NO
WITHIN THE LAST YEAR, HAVE YOU BEEN KICKED, HIT, SLAPPED, OR OTHERWISE PHYSICALLY HURT BY YOUR PARTNER OR EX-PARTNER?: NO

## 2024-04-05 ASSESSMENT — ENCOUNTER SYMPTOMS
UNEXPECTED WEIGHT CHANGE: 0
FREQUENCY: 0
NERVOUS/ANXIOUS: 0
POLYDIPSIA: 0
DIARRHEA: 0
NECK PAIN: 1
VOMITING: 0
COUGH: 0
WHEEZING: 0
SHORTNESS OF BREATH: 0
DYSPHORIC MOOD: 0
WEAKNESS: 0
HEADACHES: 0
NAUSEA: 0
FATIGUE: 1
NUMBNESS: 0
BRUISES/BLEEDS EASILY: 0
FEVER: 0
ABDOMINAL PAIN: 0
SEIZURES: 0
BACK PAIN: 1
DYSURIA: 0
SINUS PAIN: 0
ARTHRALGIAS: 1
CONSTIPATION: 0
PALPITATIONS: 0
DIZZINESS: 0
ADENOPATHY: 0
CHILLS: 0
TREMORS: 0
POLYPHAGIA: 0
APPETITE CHANGE: 0
BLOOD IN STOOL: 0

## 2024-04-05 ASSESSMENT — LIFESTYLE VARIABLES
HOW MANY STANDARD DRINKS CONTAINING ALCOHOL DO YOU HAVE ON A TYPICAL DAY: 1 OR 2
HOW OFTEN DO YOU HAVE SIX OR MORE DRINKS ON ONE OCCASION: NEVER
HOW OFTEN DO YOU HAVE A DRINK CONTAINING ALCOHOL: MONTHLY OR LESS

## 2024-04-05 ASSESSMENT — PATIENT HEALTH QUESTIONNAIRE - PHQ9: SUM OF ALL RESPONSES TO PHQ9 QUESTIONS 1 & 2: 0

## 2024-04-05 NOTE — ASSESSMENT & PLAN NOTE
Patient continues to take her Crestor as directed.  LDL and triglycerides are slightly elevated.  She will continue to monitor rather than increase the dose of her Crestor due to her fatty liver and elevated LFT.

## 2024-04-05 NOTE — ASSESSMENT & PLAN NOTE
Patient states she has had a rash on her right lower leg for several months that is not new.  She has also has a red area on her left lower leg that she feels is a reaction to chiggers that she had about 10 years ago.  The rash on her left lower leg has not changed.  Patient will start hydrocortisone cream to her right lower leg twice a day for 10 days and if her symptoms do not resolve she will follow-up with dermatology.

## 2024-04-05 NOTE — ASSESSMENT & PLAN NOTE
Patient's hemoglobin A1c was 6.2.  She will continue taking her Januvia 100 mg daily.  She will recheck her labs in 3 months.

## 2024-04-05 NOTE — PROGRESS NOTES
Internal Medicine Note       Reason for visit: Annual Exam       HPI   Mary Taylor is a 63 y.o. female who presents for annual physical. Labs reviewed. States she is having problems sleeping. She lost her partner 3 months ago . She states she has had ativan for sleep when her grandmom passes and that helped her sleep. She is scheduled for mammogram next week. Dentist 2 x per year. Eye doctor is scheduled next week.         Past Medical History:   Diagnosis Date    Deviated septum     Hepatic steatosis     Hyperlipidemia, unspecified 01/25/2021    The 10-year ASCVD risk score (Keyanna ARMAS Jr., et al., 2013) is: 4.5%   Values used to calculate the score:     Age: 61 years     Sex: Female     Is Non- : No     Diabetic: No     Tobacco smoker: No     Systolic Blood Pressure: 132 mmHg     Is BP treated: No     HDL Cholesterol: 40 mg/dL     Total Cholesterol: 235 mg/dL     Lipid disorder     Partial nontraumatic tear of rotator cuff, right     Prediabetes 2022    PVD (posterior vitreous detachment)     Sleep apnea      Past Surgical History:   Procedure Laterality Date    HAND SURGERY Right     HYSTERECTOMY  2007    ovaries and cervix left intact, uterus removed due to fibroids     Social History     Social History Narrative    Lives alone 90% of time    Pets: 1 cats    Smoke/CO detectors: yes, good batteries    Firearms: No    Sleep: 8-9 hours/avg    Diet: limits red meat and chicken    Caffeine: 2-3 cups tea     Exercise: 1-3 to 6 mile hike/week and gardening in good weather     Family History   Problem Relation Age of Onset    Diabetes Biological Mother     Lung cancer Biological Father 43        Non-smoker, possibly mets to lung from other unknown primary    Hypertension Biological Sister     Diabetes Biological Sister     Stroke Maternal Grandmother     Diabetes Maternal Grandmother     No Known Problems Maternal Grandfather     Cancer Paternal Grandmother     No Known Problems Paternal  Grandfather     Diabetes Mother's Sister     Esophageal cancer Cousin 59        unknown social history     Neutrogena moisture spf15 [sunscreen] and Soap  Current Outpatient Medications   Medication Sig Dispense Refill    escitalopram (LEXAPRO) 10 mg tablet TAKE 1 TABLET BY MOUTH EVERY DAY 30 tablet 2    hydrocortisone 0.5 % cream Apply topically 2 (two) times a day. 30 g 0    LORazepam (ATIVAN) 0.5 mg tablet Take 1 tablet (0.5 mg total) by mouth every 8 (eight) hours as needed for anxiety. 30 tablet 0    rosuvastatin (CRESTOR) 10 mg tablet Take 1 tablet (10 mg total) by mouth daily. 90 tablet 3    SITagliptin phosphate (JANUVIA) 100 mg tablet Take 1 tablet (100 mg total) by mouth daily. 90 tablet 3     No current facility-administered medications for this visit.       Review of Systems   Constitutional:  Positive for fatigue. Negative for appetite change, chills, fever and unexpected weight change.   HENT:  Negative for hearing loss and sinus pain.    Eyes:  Negative for visual disturbance (chronic).   Respiratory:  Negative for cough, shortness of breath and wheezing.    Cardiovascular:  Positive for leg swelling (occasionally in left ankle.). Negative for chest pain and palpitations.   Gastrointestinal:  Negative for abdominal pain, blood in stool, constipation, diarrhea, nausea and vomiting.   Endocrine: Negative for cold intolerance, heat intolerance, polydipsia, polyphagia and polyuria.   Genitourinary:  Negative for dysuria and frequency.        Stress incontinence   Musculoskeletal:  Positive for arthralgias (thumb and index finger left hand ,), back pain (low, follows with ortho) and neck pain (minor related to fall.). Negative for gait problem (related to back pain).        Right shoulder pain related to fall.    Skin:         Discoloration at ankle b/l . Left leg has been this color since she had chiggers years ago. Right leg is new for the last several months.    Allergic/Immunologic: Negative for  environmental allergies, food allergies and immunocompromised state.   Neurological:  Negative for dizziness, tremors, seizures, weakness, numbness and headaches.   Hematological:  Negative for adenopathy. Does not bruise/bleed easily.   Psychiatric/Behavioral:  Negative for dysphoric mood. The patient is not nervous/anxious.        Objective   Vitals:    04/05/24 1419   BP: 126/86   Pulse: 64   Temp: 37 °C (98.6 °F)   SpO2: 98%       Physical Exam  Constitutional:       Appearance: Normal appearance.   HENT:      Right Ear: Tympanic membrane and ear canal normal.      Left Ear: Tympanic membrane and ear canal normal.      Mouth/Throat:      Mouth: Mucous membranes are moist.      Pharynx: No oropharyngeal exudate or posterior oropharyngeal erythema.   Eyes:      Conjunctiva/sclera: Conjunctivae normal.      Pupils: Pupils are equal, round, and reactive to light.   Cardiovascular:      Rate and Rhythm: Normal rate and regular rhythm.      Heart sounds: Normal heart sounds.   Pulmonary:      Effort: Pulmonary effort is normal.      Breath sounds: Normal breath sounds.   Abdominal:      General: Bowel sounds are normal. There is no distension.      Palpations: Abdomen is soft.      Tenderness: There is no abdominal tenderness.   Musculoskeletal:      Cervical back: Neck supple.   Skin:     General: Skin is warm and dry.   Neurological:      General: No focal deficit present.         Lab Results   Component Value Date    WBC 7.5 04/04/2024    HGB 12.1 04/04/2024     04/04/2024    CHOL 171 04/04/2024    TRIG 153 (H) 04/04/2024    HDL 42 04/04/2024    ALT 38 (H) 04/04/2024    AST 34 04/04/2024     04/04/2024    K 4.1 04/04/2024    CREATININE 0.67 04/04/2024    TSH 1.250 04/04/2024    HGBA1C 6.2 (H) 04/04/2024          Current Outpatient Medications:     escitalopram (LEXAPRO) 10 mg tablet, TAKE 1 TABLET BY MOUTH EVERY DAY, Disp: 30 tablet, Rfl: 2    hydrocortisone 0.5 % cream, Apply topically 2 (two) times a  day., Disp: 30 g, Rfl: 0    LORazepam (ATIVAN) 0.5 mg tablet, Take 1 tablet (0.5 mg total) by mouth every 8 (eight) hours as needed for anxiety., Disp: 30 tablet, Rfl: 0    rosuvastatin (CRESTOR) 10 mg tablet, Take 1 tablet (10 mg total) by mouth daily., Disp: 90 tablet, Rfl: 3    SITagliptin phosphate (JANUVIA) 100 mg tablet, Take 1 tablet (100 mg total) by mouth daily., Disp: 90 tablet, Rfl: 3      Assessment   Diagnoses and all orders for this visit:    Physical exam (Primary)  Assessment & Plan:  Patient is a 63-year-old female who presents today for her annual physical.  She states that her significant other passed away 3 months ago and she has been handling the estate.  She is having difficulty sleeping and is requesting Ativan to help her sleep.  She is taking her medications as directed.  Labs were reviewed at today's office visit.  She is up-to-date with her vaccines and would be due for colonoscopy in July.  She sees the dentist twice a year the eye doctor once a year and she is up-to-date with her gynecologist.  She has her mammogram scheduled for next week.  Patient will return to the office in 6 months with updated labs.      Rash  Assessment & Plan:  Patient states she has had a rash on her right lower leg for several months that is not new.  She has also has a red area on her left lower leg that she feels is a reaction to chiggers that she had about 10 years ago.  The rash on her left lower leg has not changed.  Patient will start hydrocortisone cream to her right lower leg twice a day for 10 days and if her symptoms do not resolve she will follow-up with dermatology.    Orders:  -     Ambulatory referral to Dermatology; Future    Prediabetes  Assessment & Plan:  Patient's hemoglobin A1c was 6.2.  She will continue taking her Januvia 100 mg daily.  She will recheck her labs in 3 months.    Orders:  -     Comprehensive metabolic panel; Future  -     Hemoglobin A1c; Future    Hyperlipidemia, unspecified  hyperlipidemia type  Assessment & Plan:  Patient continues to take her Crestor as directed.  LDL and triglycerides are slightly elevated.  She will continue to monitor rather than increase the dose of her Crestor due to her fatty liver and elevated LFT.    Orders:  -     Comprehensive metabolic panel; Future  -     Lipid panel; Future    Preventative health care  -     Vitamin B12; Future    Other orders  -     hydrocortisone 0.5 % cream; Apply topically 2 (two) times a day.  -     LORazepam (ATIVAN) 0.5 mg tablet; Take 1 tablet (0.5 mg total) by mouth every 8 (eight) hours as needed for anxiety.            AMY Marcus  4/5/2024  2:31 PM

## 2024-04-05 NOTE — ASSESSMENT & PLAN NOTE
Patient is a 63-year-old female who presents today for her annual physical.  She states that her significant other passed away 3 months ago and she has been handling the estate.  She is having difficulty sleeping and is requesting Ativan to help her sleep.  She is taking her medications as directed.  Labs were reviewed at today's office visit.  She is up-to-date with her vaccines and would be due for colonoscopy in July.  She sees the dentist twice a year the eye doctor once a year and she is up-to-date with her gynecologist.  She has her mammogram scheduled for next week.  Patient will return to the office in 6 months with updated labs.

## 2024-04-08 ENCOUNTER — TELEPHONE (OUTPATIENT)
Dept: INTERNAL MEDICINE | Facility: CLINIC | Age: 64
End: 2024-04-08
Payer: COMMERCIAL

## 2024-04-08 NOTE — TELEPHONE ENCOUNTER
Patient is requesting a mammogram order to be sent to her appointment.  Her appointment is tomorrow.

## 2024-04-09 DIAGNOSIS — Z12.31 ENCOUNTER FOR SCREENING MAMMOGRAM FOR MALIGNANT NEOPLASM OF BREAST: Primary | ICD-10-CM

## 2024-04-17 ENCOUNTER — TELEPHONE (OUTPATIENT)
Dept: INTERNAL MEDICINE | Facility: CLINIC | Age: 64
End: 2024-04-17
Payer: COMMERCIAL

## 2024-04-17 RX ORDER — ROSUVASTATIN CALCIUM 10 MG/1
10 TABLET, COATED ORAL DAILY
Qty: 90 TABLET | Refills: 1 | Status: SHIPPED | OUTPATIENT
Start: 2024-04-17

## 2024-04-17 NOTE — TELEPHONE ENCOUNTER
rosuvastatin (CRESTOR) 10 mg tablet     Please send medication to pharmacy [facility] [place]

## 2024-04-19 LAB — VIT B12 SERPL-MCNC: 503 PG/ML (ref 232–1245)

## 2024-04-22 ENCOUNTER — TELEPHONE (OUTPATIENT)
Dept: INTERNAL MEDICINE | Facility: CLINIC | Age: 64
End: 2024-04-22
Payer: COMMERCIAL